# Patient Record
Sex: MALE | Race: BLACK OR AFRICAN AMERICAN | Employment: UNEMPLOYED | URBAN - METROPOLITAN AREA
[De-identification: names, ages, dates, MRNs, and addresses within clinical notes are randomized per-mention and may not be internally consistent; named-entity substitution may affect disease eponyms.]

---

## 2017-02-10 ENCOUNTER — OP HISTORICAL/CONVERTED ENCOUNTER (OUTPATIENT)
Dept: OTHER | Age: 47
End: 2017-02-10

## 2017-12-11 ENCOUNTER — ED HISTORICAL/CONVERTED ENCOUNTER (OUTPATIENT)
Dept: OTHER | Age: 47
End: 2017-12-11

## 2019-08-29 ENCOUNTER — OP HISTORICAL/CONVERTED ENCOUNTER (OUTPATIENT)
Dept: OTHER | Age: 49
End: 2019-08-29

## 2020-06-09 ENCOUNTER — ED HISTORICAL/CONVERTED ENCOUNTER (OUTPATIENT)
Dept: OTHER | Age: 50
End: 2020-06-09

## 2021-07-29 ENCOUNTER — APPOINTMENT (OUTPATIENT)
Dept: GENERAL RADIOLOGY | Age: 51
End: 2021-07-29
Attending: PHYSICIAN ASSISTANT
Payer: OTHER GOVERNMENT

## 2021-07-29 ENCOUNTER — HOSPITAL ENCOUNTER (EMERGENCY)
Age: 51
Discharge: HOME | End: 2021-07-29
Admitting: EMERGENCY MEDICINE
Payer: OTHER GOVERNMENT

## 2021-07-29 VITALS
OXYGEN SATURATION: 95 % | HEIGHT: 74 IN | HEART RATE: 67 BPM | RESPIRATION RATE: 20 BRPM | WEIGHT: 277 LBS | TEMPERATURE: 99.6 F | BODY MASS INDEX: 35.55 KG/M2 | SYSTOLIC BLOOD PRESSURE: 154 MMHG | DIASTOLIC BLOOD PRESSURE: 104 MMHG

## 2021-07-29 DIAGNOSIS — U07.1 COVID-19: Primary | ICD-10-CM

## 2021-07-29 DIAGNOSIS — K29.90 GASTRITIS AND DUODENITIS: ICD-10-CM

## 2021-07-29 DIAGNOSIS — R50.9 FEVER, UNSPECIFIED FEVER CAUSE: ICD-10-CM

## 2021-07-29 LAB
ALBUMIN SERPL-MCNC: 3.6 G/DL (ref 3.5–5)
ALBUMIN/GLOB SERPL: 0.9 {RATIO} (ref 1.1–2.2)
ALP SERPL-CCNC: 46 U/L (ref 45–117)
ALT SERPL-CCNC: 33 U/L (ref 12–78)
ANION GAP SERPL CALC-SCNC: 6 MMOL/L (ref 5–15)
AST SERPL W P-5'-P-CCNC: 43 U/L (ref 15–37)
BASOPHILS # BLD: 0 K/UL (ref 0–0.1)
BASOPHILS NFR BLD: 0 % (ref 0–1)
BILIRUB SERPL-MCNC: 1 MG/DL (ref 0.2–1)
BUN SERPL-MCNC: 17 MG/DL (ref 6–20)
BUN/CREAT SERPL: 13 (ref 12–20)
CA-I BLD-MCNC: 8.5 MG/DL (ref 8.5–10.1)
CHLORIDE SERPL-SCNC: 100 MMOL/L (ref 97–108)
CO2 SERPL-SCNC: 24 MMOL/L (ref 21–32)
CREAT SERPL-MCNC: 1.33 MG/DL (ref 0.7–1.3)
DIFFERENTIAL METHOD BLD: ABNORMAL
EOSINOPHIL # BLD: 0 K/UL (ref 0–0.4)
EOSINOPHIL NFR BLD: 0 % (ref 0–7)
ERYTHROCYTE [DISTWIDTH] IN BLOOD BY AUTOMATED COUNT: 13.2 % (ref 11.5–14.5)
GLOBULIN SER CALC-MCNC: 4.2 G/DL (ref 2–4)
GLUCOSE SERPL-MCNC: 124 MG/DL (ref 65–100)
HCT VFR BLD AUTO: 43.9 % (ref 36.6–50.3)
HGB BLD-MCNC: 16.2 G/DL (ref 12.1–17)
IMM GRANULOCYTES # BLD AUTO: 0 K/UL (ref 0–0.04)
IMM GRANULOCYTES NFR BLD AUTO: 0 % (ref 0–0.5)
LYMPHOCYTES # BLD: 0.9 K/UL (ref 0.8–3.5)
LYMPHOCYTES NFR BLD: 18 % (ref 12–49)
MCH RBC QN AUTO: 29.2 PG (ref 26–34)
MCHC RBC AUTO-ENTMCNC: 36.9 G/DL (ref 30–36.5)
MCV RBC AUTO: 79.1 FL (ref 80–99)
MONOCYTES # BLD: 0.4 K/UL (ref 0–1)
MONOCYTES NFR BLD: 7 % (ref 5–13)
NEUTS SEG # BLD: 3.9 K/UL (ref 1.8–8)
NEUTS SEG NFR BLD: 75 % (ref 32–75)
NRBC # BLD: 0 K/UL (ref 0–0.01)
NRBC BLD-RTO: 0 PER 100 WBC
PLATELET # BLD AUTO: 119 K/UL (ref 150–400)
PMV BLD AUTO: 10.4 FL (ref 8.9–12.9)
POTASSIUM SERPL-SCNC: 3.6 MMOL/L (ref 3.5–5.1)
PROT SERPL-MCNC: 7.8 G/DL (ref 6.4–8.2)
RBC # BLD AUTO: 5.55 M/UL (ref 4.1–5.7)
SODIUM SERPL-SCNC: 130 MMOL/L (ref 136–145)
WBC # BLD AUTO: 5.2 K/UL (ref 4.1–11.1)

## 2021-07-29 PROCEDURE — 74011250636 HC RX REV CODE- 250/636: Performed by: PHYSICIAN ASSISTANT

## 2021-07-29 PROCEDURE — 96374 THER/PROPH/DIAG INJ IV PUSH: CPT

## 2021-07-29 PROCEDURE — 74011250637 HC RX REV CODE- 250/637: Performed by: PHYSICIAN ASSISTANT

## 2021-07-29 PROCEDURE — 99284 EMERGENCY DEPT VISIT MOD MDM: CPT

## 2021-07-29 PROCEDURE — 80053 COMPREHEN METABOLIC PANEL: CPT

## 2021-07-29 PROCEDURE — 36415 COLL VENOUS BLD VENIPUNCTURE: CPT

## 2021-07-29 PROCEDURE — 85025 COMPLETE CBC W/AUTO DIFF WBC: CPT

## 2021-07-29 PROCEDURE — 71045 X-RAY EXAM CHEST 1 VIEW: CPT

## 2021-07-29 PROCEDURE — 74011636637 HC RX REV CODE- 636/637: Performed by: PHYSICIAN ASSISTANT

## 2021-07-29 RX ORDER — IBUPROFEN 600 MG/1
600 TABLET ORAL
Status: COMPLETED | OUTPATIENT
Start: 2021-07-29 | End: 2021-07-29

## 2021-07-29 RX ORDER — MAG HYDROX/ALUMINUM HYD/SIMETH 200-200-20
30 SUSPENSION, ORAL (FINAL DOSE FORM) ORAL ONCE
Status: COMPLETED | OUTPATIENT
Start: 2021-07-29 | End: 2021-07-29

## 2021-07-29 RX ORDER — ALBUTEROL SULFATE 90 UG/1
1 AEROSOL, METERED RESPIRATORY (INHALATION)
Qty: 1 INHALER | Refills: 0 | Status: SHIPPED | OUTPATIENT
Start: 2021-07-29

## 2021-07-29 RX ORDER — ONDANSETRON 2 MG/ML
4 INJECTION INTRAMUSCULAR; INTRAVENOUS
Status: COMPLETED | OUTPATIENT
Start: 2021-07-29 | End: 2021-07-29

## 2021-07-29 RX ORDER — METHYLPREDNISOLONE 4 MG/1
TABLET ORAL
Qty: 1 DOSE PACK | Refills: 0 | Status: SHIPPED | OUTPATIENT
Start: 2021-07-29 | End: 2021-08-07

## 2021-07-29 RX ORDER — PREDNISONE 20 MG/1
20 TABLET ORAL
Status: COMPLETED | OUTPATIENT
Start: 2021-07-29 | End: 2021-07-29

## 2021-07-29 RX ADMIN — ALUMINUM HYDROXIDE, MAGNESIUM HYDROXIDE, AND SIMETHICONE 30 ML: 200; 200; 20 SUSPENSION ORAL at 19:26

## 2021-07-29 RX ADMIN — ONDANSETRON 4 MG: 2 INJECTION INTRAMUSCULAR; INTRAVENOUS at 19:26

## 2021-07-29 RX ADMIN — ACETAMINOPHEN 1000 MG: 650 SOLUTION ORAL at 19:26

## 2021-07-29 RX ADMIN — IBUPROFEN 600 MG: 600 TABLET, FILM COATED ORAL at 19:26

## 2021-07-29 RX ADMIN — PREDNISONE 20 MG: 20 TABLET ORAL at 21:19

## 2021-07-29 RX ADMIN — SODIUM CHLORIDE 1000 ML: 9 INJECTION, SOLUTION INTRAVENOUS at 19:26

## 2021-07-29 NOTE — ED PROVIDER NOTES
EMERGENCY DEPARTMENT HISTORY AND PHYSICAL EXAM      Date: 7/29/2021  Patient Name: Gamaliel Torres    History of Presenting Illness     Chief Complaint   Patient presents with    Positive For Covid-19       History Provided By: Patient    HPI: Gamaliel Torres, 48 y.o. male with a past medical history significant for polycystic kidney disease and hypertension who presents to the ED with cc of persistent fever and generalized weakness/fatigue x3 days after being diagnosed with COVID-19 on 7/26/2021. Associated symptoms include nausea. States that he was also recently diagnosed with sinusitis and is currently taking Augmentin. He is taking Tylenol every 6 hours and states that he is beginning to have some nausea from the medication. Patient denies fever, chills, chest pain, shortness of breath, vomiting, diarrhea    There are no other complaints, changes, or physical findings at this time. PCP: No primary care provider on file. No current facility-administered medications on file prior to encounter. No current outpatient medications on file prior to encounter. Past History     Past Medical History:  Past Medical History:   Diagnosis Date    Hypertension     Polycystic kidney disease        Past Surgical History:  History reviewed. No pertinent surgical history. Family History:  History reviewed. No pertinent family history. Social History:  Social History     Tobacco Use    Smoking status: Never Smoker   Substance Use Topics    Alcohol use: Not on file    Drug use: Not on file       Allergies:  No Known Allergies      Review of Systems     Review of Systems   Constitutional: Positive for fatigue and fever. Negative for chills. HENT: Negative. Respiratory: Negative for cough, chest tightness, shortness of breath and wheezing. Cardiovascular: Negative for chest pain and palpitations. Gastrointestinal: Positive for nausea. Negative for abdominal pain, diarrhea and vomiting. Genitourinary: Negative for frequency and urgency. Musculoskeletal: Negative for back pain, neck pain and neck stiffness. Skin: Negative for rash. Neurological: Negative for dizziness, weakness, light-headedness and headaches. Psychiatric/Behavioral: Negative. All other systems reviewed and are negative. Physical Exam     Physical Exam  Vitals and nursing note reviewed. Constitutional:       General: He is not in acute distress. Appearance: Normal appearance. He is well-developed. He is not ill-appearing, toxic-appearing or diaphoretic. Comments: Warm to touch   HENT:      Head: Normocephalic and atraumatic. Nose: Nose normal. No congestion or rhinorrhea. Mouth/Throat:      Mouth: Mucous membranes are moist.      Pharynx: Oropharynx is clear. No oropharyngeal exudate or posterior oropharyngeal erythema. Eyes:      General: No scleral icterus. Conjunctiva/sclera: Conjunctivae normal.      Pupils: Pupils are equal, round, and reactive to light. Cardiovascular:      Rate and Rhythm: Normal rate and regular rhythm. Pulses:           Radial pulses are 2+ on the right side and 2+ on the left side. Dorsalis pedis pulses are 2+ on the right side and 2+ on the left side. Heart sounds: No murmur heard. No friction rub. No gallop. Pulmonary:      Effort: Pulmonary effort is normal. No tachypnea, accessory muscle usage, respiratory distress or retractions. Breath sounds: Normal breath sounds. No stridor. No decreased breath sounds, wheezing, rhonchi or rales. Chest:      Chest wall: No tenderness. Abdominal:      General: Bowel sounds are normal. There is no distension. Palpations: Abdomen is soft. There is no mass. Tenderness: There is no abdominal tenderness. There is no right CVA tenderness, left CVA tenderness, guarding or rebound. Musculoskeletal:         General: No deformity. Normal range of motion.       Cervical back: Normal range of motion and neck supple. No rigidity. No muscular tenderness. Right lower leg: No edema. Left lower leg: No edema. Skin:     General: Skin is warm and dry. Capillary Refill: Capillary refill takes less than 2 seconds. Coloration: Skin is not jaundiced or pale. Findings: No bruising, erythema or rash. Neurological:      General: No focal deficit present. Mental Status: He is alert and oriented to person, place, and time. Mental status is at baseline. Sensory: Sensation is intact. Motor: Motor function is intact. Psychiatric:         Mood and Affect: Mood normal.         Behavior: Behavior normal. Behavior is cooperative. Thought Content: Thought content normal.         Judgment: Judgment normal.         Lab and Diagnostic Study Results     Labs -     Recent Results (from the past 12 hour(s))   CBC WITH AUTOMATED DIFF    Collection Time: 07/29/21  7:12 PM   Result Value Ref Range    WBC 5.2 4.1 - 11.1 K/uL    RBC 5.55 4.10 - 5.70 M/uL    HGB 16.2 12.1 - 17.0 g/dL    HCT 43.9 36.6 - 50.3 %    MCV 79.1 (L) 80.0 - 99.0 FL    MCH 29.2 26.0 - 34.0 PG    MCHC 36.9 (H) 30.0 - 36.5 g/dL    RDW 13.2 11.5 - 14.5 %    PLATELET 410 (L) 414 - 400 K/uL    MPV 10.4 8.9 - 12.9 FL    NRBC 0.0 0.0  WBC    ABSOLUTE NRBC 0.00 0.00 - 0.01 K/uL    NEUTROPHILS 75 32 - 75 %    LYMPHOCYTES 18 12 - 49 %    MONOCYTES 7 5 - 13 %    EOSINOPHILS 0 0 - 7 %    BASOPHILS 0 0 - 1 %    IMMATURE GRANULOCYTES 0 0 - 0.5 %    ABS. NEUTROPHILS 3.9 1.8 - 8.0 K/UL    ABS. LYMPHOCYTES 0.9 0.8 - 3.5 K/UL    ABS. MONOCYTES 0.4 0.0 - 1.0 K/UL    ABS. EOSINOPHILS 0.0 0.0 - 0.4 K/UL    ABS. BASOPHILS 0.0 0.0 - 0.1 K/UL    ABS. IMM.  GRANS. 0.0 0.00 - 0.04 K/UL    DF AUTOMATED     METABOLIC PANEL, COMPREHENSIVE    Collection Time: 07/29/21  7:12 PM   Result Value Ref Range    Sodium 130 (L) 136 - 145 mmol/L    Potassium 3.6 3.5 - 5.1 mmol/L    Chloride 100 97 - 108 mmol/L    CO2 24 21 - 32 mmol/L    Anion gap 6 5 - 15 mmol/L    Glucose 124 (H) 65 - 100 mg/dL    BUN 17 6 - 20 mg/dL    Creatinine 1.33 (H) 0.70 - 1.30 mg/dL    BUN/Creatinine ratio 13 12 - 20      GFR est AA >60 >60 ml/min/1.73m2    GFR est non-AA 57 (L) >60 ml/min/1.73m2    Calcium 8.5 8.5 - 10.1 mg/dL    Bilirubin, total 1.0 0.2 - 1.0 mg/dL    AST (SGOT) 43 (H) 15 - 37 U/L    ALT (SGPT) 33 12 - 78 U/L    Alk. phosphatase 46 45 - 117 U/L    Protein, total 7.8 6.4 - 8.2 g/dL    Albumin 3.6 3.5 - 5.0 g/dL    Globulin 4.2 (H) 2.0 - 4.0 g/dL    A-G Ratio 0.9 (L) 1.1 - 2.2         Radiologic Studies -   CXR Results  (Last 48 hours)               07/29/21 1918  XR CHEST PORT Final result    Impression:  Patchy bilateral airspace opacities, nonspecific, but would be compatible with   provided clinical history of viral pneumonia. Narrative:  Examination: XR CHEST PORT        History: weakness, +covid       Comparison: None available. FINDINGS:       Single frontal portable view of the chest. Patchy bilateral airspace opacities. No pneumothorax or significant pleural effusion is evident. Cardiac silhouette   is upper limits of normal for size, likely accentuated by low lung volumes and   portable technique. No findings of acute mediastinal abnormality. No acute or   aggressive osseous abnormalities are evident. Medical Decision Making   - I am the first provider for this patient. - I reviewed the vital signs, available nursing notes, past medical history, past surgical history, family history and social history. - Initial assessment performed. The patients presenting problems have been discussed, and they are in agreement with the care plan formulated and outlined with them. I have encouraged them to ask questions as they arise throughout their visit. Vital Signs-Reviewed the patient's vital signs.   Patient Vitals for the past 24 hrs:   Temp Pulse Resp BP SpO2   07/29/21 2138 99.6 °F (37.6 °C) 67 20 (!) 154/104 95 % 07/29/21 2019 (!) 101 °F (38.3 °C) 69 20 (!) 146/86 95 %   07/29/21 1846 (!) 103 °F (39.4 °C) 76 18 (!) 160/98 97 %       Records Reviewed: Nursing Notes and Old Medical Records    The patient presents with fever with a differential diagnosis of covid-19, pneumonia, gastritis, medication side effect      ED Course:          Provider Notes (Medical Decision Making):     MDM  Number of Diagnoses or Management Options  COVID-19  Fever, unspecified fever cause  Gastritis and duodenitis  Diagnosis management comments:     45-year-old male with positive COVID-19 diagnosis with persistent fever and fatigue. CBC within normal is without leukocytosis. CMP with elevated creatinine-patient does have history of polycystic kidney disease. Chest x-ray patchy bilateral opacities. Patient's fever improved with IV fluids, Tylenol, ibuprofen. Given abnormal chest x-ray finding-will start on steroids and give albuterol inhaler to use as needed although patient is not complaining of shortness of breath. SPO2 is 95-96% on room air. No hypoxia with ambulation. No increased work of breathing or tachypnea. Patient already on Augmentin for sinus infection-has a few days left. Discussed with patient this is likely what is causing his GI upset and recommended taking Augmentin with food. Patient nontoxic appearing, stable VS, tolerating PO. Reviewed care plan with patient. Recommend follow up with PCP within 24-48 hours. Return precautions to ED discussed if symptoms worsen. Patient agreeable with plan of care. Amount and/or Complexity of Data Reviewed  Clinical lab tests: ordered and reviewed  Tests in the radiology section of CPT®: ordered and reviewed  Review and summarize past medical records: yes    Patient Progress  Patient progress: stable           Disposition   Disposition: DC- Adult Discharges: All of the diagnostic tests were reviewed and questions answered.  Diagnosis, care plan and treatment options were discussed. The patient understands the instructions and will follow up as directed. The patients results have been reviewed with them. They have been counseled regarding their diagnosis. The patient verbally convey understanding and agreement of the signs, symptoms, diagnosis, treatment and prognosis and additionally agrees to follow up as recommended with their PCP in 24 - 48 hours. They also agree with the care-plan and convey that all of their questions have been answered. I have also put together some discharge instructions for them that include: 1) educational information regarding their diagnosis, 2) how to care for their diagnosis at home, as well a 3) list of reasons why they would want to return to the ED prior to their follow-up appointment, should their condition change. DISCHARGE PLAN:  1. There are no discharge medications for this patient. 2.   Follow-up Information     Follow up With Specialties Details Why Contact Info    Primary Care Provider  In 2 days As needed     [facility] EMERGENCY DEPT Emergency Medicine  As needed, If symptoms worsen [address] [facility] [address] [phone]        3. Return to ED if worse   4. Current Discharge Medication List      START taking these medications    Details   methylPREDNISolone (Medrol, Ramses,) 4 mg tablet See instructions  Qty: 1 Dose Pack, Refills: 0  Start date: 7/29/2021      albuterol (ProAir HFA) 90 mcg/actuation inhaler Take 1 Puff by inhalation every four (4) hours as needed for Wheezing. Qty: 1 Inhaler, Refills: 0  Start date: 7/29/2021               Diagnosis     Clinical Impression:   1. COVID-19    2. Fever, unspecified fever cause    3. Gastritis and duodenitis        Attestations:    TORIBIO Sanchez    Please note that this dictation was completed with [employer], the [employer] voice recognition software.   Quite often unanticipated grammatical, syntax, homophones, and other interpretive errors are inadvertently transcribed by the computer software. Please disregard these errors. Please excuse any errors that have escaped final proofreading. Thank you.

## 2021-07-30 ENCOUNTER — PATIENT OUTREACH (OUTPATIENT)
Dept: CASE MANAGEMENT | Age: 51
End: 2021-07-30

## 2021-08-03 ENCOUNTER — HOSPITAL ENCOUNTER (INPATIENT)
Age: 51
LOS: 4 days | Discharge: HOME | DRG: 137 | End: 2021-08-07
Attending: STUDENT IN AN ORGANIZED HEALTH CARE EDUCATION/TRAINING PROGRAM | Admitting: INTERNAL MEDICINE
Payer: MEDICAID

## 2021-08-03 ENCOUNTER — APPOINTMENT (OUTPATIENT)
Dept: GENERAL RADIOLOGY | Age: 51
DRG: 137 | End: 2021-08-03
Attending: STUDENT IN AN ORGANIZED HEALTH CARE EDUCATION/TRAINING PROGRAM
Payer: MEDICAID

## 2021-08-03 ENCOUNTER — PATIENT OUTREACH (OUTPATIENT)
Dept: CASE MANAGEMENT | Age: 51
End: 2021-08-03

## 2021-08-03 DIAGNOSIS — U07.1 PNEUMONIA DUE TO COVID-19 VIRUS: Primary | ICD-10-CM

## 2021-08-03 DIAGNOSIS — R09.02 HYPOXIA: ICD-10-CM

## 2021-08-03 DIAGNOSIS — J12.82 PNEUMONIA DUE TO COVID-19 VIRUS: Primary | ICD-10-CM

## 2021-08-03 PROBLEM — I10 ESSENTIAL HYPERTENSION: Status: ACTIVE | Noted: 2021-08-03

## 2021-08-03 PROBLEM — J96.01 ACUTE RESPIRATORY FAILURE WITH HYPOXIA (HCC): Status: ACTIVE | Noted: 2021-08-03

## 2021-08-03 PROBLEM — Q61.3 POLYCYSTIC KIDNEY DISEASE: Status: ACTIVE | Noted: 2021-08-03

## 2021-08-03 LAB
ALBUMIN SERPL-MCNC: 3.4 G/DL (ref 3.5–5)
ALBUMIN/GLOB SERPL: 0.7 {RATIO} (ref 1.1–2.2)
ALP SERPL-CCNC: 70 U/L (ref 45–117)
ALT SERPL-CCNC: 52 U/L (ref 12–78)
ANION GAP SERPL CALC-SCNC: 8 MMOL/L (ref 5–15)
AST SERPL W P-5'-P-CCNC: 36 U/L (ref 15–37)
ATRIAL RATE: 74 BPM
BASOPHILS # BLD: 0 K/UL (ref 0–0.1)
BASOPHILS NFR BLD: 0 % (ref 0–1)
BILIRUB SERPL-MCNC: 1.6 MG/DL (ref 0.2–1)
BNP SERPL-MCNC: 27 PG/ML
BUN SERPL-MCNC: 24 MG/DL (ref 6–20)
BUN/CREAT SERPL: 18 (ref 12–20)
CA-I BLD-MCNC: 9.4 MG/DL (ref 8.5–10.1)
CALCULATED P AXIS, ECG09: 38 DEGREES
CALCULATED R AXIS, ECG10: -7 DEGREES
CALCULATED T AXIS, ECG11: 23 DEGREES
CHLORIDE SERPL-SCNC: 102 MMOL/L (ref 97–108)
CO2 SERPL-SCNC: 26 MMOL/L (ref 21–32)
CREAT SERPL-MCNC: 1.35 MG/DL (ref 0.7–1.3)
CRP SERPL-MCNC: 10.8 MG/DL (ref 0–0.6)
D DIMER PPP FEU-MCNC: 0.51 UG/ML(FEU)
DIAGNOSIS, 93000: NORMAL
DIFFERENTIAL METHOD BLD: ABNORMAL
EOSINOPHIL # BLD: 0 K/UL (ref 0–0.4)
EOSINOPHIL NFR BLD: 0 % (ref 0–7)
ERYTHROCYTE [DISTWIDTH] IN BLOOD BY AUTOMATED COUNT: 13.2 % (ref 11.5–14.5)
GLOBULIN SER CALC-MCNC: 4.6 G/DL (ref 2–4)
GLUCOSE SERPL-MCNC: 102 MG/DL (ref 65–100)
HCT VFR BLD AUTO: 45.8 % (ref 36.6–50.3)
HGB BLD-MCNC: 16.8 G/DL (ref 12.1–17)
IMM GRANULOCYTES # BLD AUTO: 0 K/UL
IMM GRANULOCYTES NFR BLD AUTO: 0 %
LACTATE SERPL-SCNC: 2 MMOL/L (ref 0.4–2)
LYMPHOCYTES # BLD: 1.9 K/UL (ref 0.8–3.5)
LYMPHOCYTES NFR BLD: 14 % (ref 12–49)
MCH RBC QN AUTO: 29 PG (ref 26–34)
MCHC RBC AUTO-ENTMCNC: 36.7 G/DL (ref 30–36.5)
MCV RBC AUTO: 79 FL (ref 80–99)
MONOCYTES # BLD: 0.4 K/UL (ref 0–1)
MONOCYTES NFR BLD: 3 % (ref 5–13)
MYELOCYTES NFR BLD MANUAL: 2 %
NEUTS BAND NFR BLD MANUAL: 1 % (ref 0–6)
NEUTS SEG # BLD: 10.1 K/UL (ref 1.8–8)
NEUTS SEG NFR BLD: 73 % (ref 32–75)
NRBC # BLD: 0 K/UL (ref 0–0.01)
NRBC BLD-RTO: 0 PER 100 WBC
OTHER CELLS NFR BLD MANUAL: 7 %
P-R INTERVAL, ECG05: 152 MS
PLATELET # BLD AUTO: 309 K/UL (ref 150–400)
PMV BLD AUTO: 10.3 FL (ref 8.9–12.9)
POTASSIUM SERPL-SCNC: 4.2 MMOL/L (ref 3.5–5.1)
PROCALCITONIN SERPL-MCNC: <0.05 NG/ML
PROT SERPL-MCNC: 8 G/DL (ref 6.4–8.2)
Q-T INTERVAL, ECG07: 410 MS
QRS DURATION, ECG06: 86 MS
QTC CALCULATION (BEZET), ECG08: 455 MS
RBC # BLD AUTO: 5.8 M/UL (ref 4.1–5.7)
RBC MORPH BLD: ABNORMAL
SODIUM SERPL-SCNC: 136 MMOL/L (ref 136–145)
TROPONIN I SERPL-MCNC: <0.05 NG/ML
VENTRICULAR RATE, ECG03: 74 BPM
WBC # BLD AUTO: 13.6 K/UL (ref 4.1–11.1)

## 2021-08-03 PROCEDURE — 93005 ELECTROCARDIOGRAM TRACING: CPT

## 2021-08-03 PROCEDURE — 84145 PROCALCITONIN (PCT): CPT

## 2021-08-03 PROCEDURE — 74011250636 HC RX REV CODE- 250/636: Performed by: INTERNAL MEDICINE

## 2021-08-03 PROCEDURE — 99223 1ST HOSP IP/OBS HIGH 75: CPT | Performed by: INTERNAL MEDICINE

## 2021-08-03 PROCEDURE — 74011250637 HC RX REV CODE- 250/637: Performed by: INTERNAL MEDICINE

## 2021-08-03 PROCEDURE — 85379 FIBRIN DEGRADATION QUANT: CPT

## 2021-08-03 PROCEDURE — 86140 C-REACTIVE PROTEIN: CPT

## 2021-08-03 PROCEDURE — 85025 COMPLETE CBC W/AUTO DIFF WBC: CPT

## 2021-08-03 PROCEDURE — 83880 ASSAY OF NATRIURETIC PEPTIDE: CPT

## 2021-08-03 PROCEDURE — 65270000029 HC RM PRIVATE

## 2021-08-03 PROCEDURE — XW033H5 INTRODUCTION OF TOCILIZUMAB INTO PERIPHERAL VEIN, PERCUTANEOUS APPROACH, NEW TECHNOLOGY GROUP 5: ICD-10-PCS | Performed by: INTERNAL MEDICINE

## 2021-08-03 PROCEDURE — 87040 BLOOD CULTURE FOR BACTERIA: CPT

## 2021-08-03 PROCEDURE — 71045 X-RAY EXAM CHEST 1 VIEW: CPT

## 2021-08-03 PROCEDURE — 84484 ASSAY OF TROPONIN QUANT: CPT

## 2021-08-03 PROCEDURE — 36415 COLL VENOUS BLD VENIPUNCTURE: CPT

## 2021-08-03 PROCEDURE — 83605 ASSAY OF LACTIC ACID: CPT

## 2021-08-03 PROCEDURE — 74011000258 HC RX REV CODE- 258: Performed by: INTERNAL MEDICINE

## 2021-08-03 PROCEDURE — 80053 COMPREHEN METABOLIC PANEL: CPT

## 2021-08-03 PROCEDURE — 94760 N-INVAS EAR/PLS OXIMETRY 1: CPT

## 2021-08-03 PROCEDURE — 99285 EMERGENCY DEPT VISIT HI MDM: CPT

## 2021-08-03 RX ORDER — FAMOTIDINE 20 MG/1
20 TABLET, FILM COATED ORAL 2 TIMES DAILY
Status: DISCONTINUED | OUTPATIENT
Start: 2021-08-03 | End: 2021-08-07 | Stop reason: HOSPADM

## 2021-08-03 RX ORDER — ONDANSETRON 4 MG/1
4 TABLET, ORALLY DISINTEGRATING ORAL
Status: DISCONTINUED | OUTPATIENT
Start: 2021-08-03 | End: 2021-08-07 | Stop reason: HOSPADM

## 2021-08-03 RX ORDER — ZINC SULFATE 50(220)MG
1 CAPSULE ORAL DAILY
Status: DISCONTINUED | OUTPATIENT
Start: 2021-08-04 | End: 2021-08-07 | Stop reason: HOSPADM

## 2021-08-03 RX ORDER — AMLODIPINE BESYLATE 5 MG/1
10 TABLET ORAL DAILY
Status: DISCONTINUED | OUTPATIENT
Start: 2021-08-04 | End: 2021-08-07 | Stop reason: HOSPADM

## 2021-08-03 RX ORDER — SODIUM CHLORIDE 0.9 % (FLUSH) 0.9 %
5-40 SYRINGE (ML) INJECTION AS NEEDED
Status: DISCONTINUED | OUTPATIENT
Start: 2021-08-03 | End: 2021-08-07 | Stop reason: HOSPADM

## 2021-08-03 RX ORDER — MELATONIN
2000 DAILY
Status: DISCONTINUED | OUTPATIENT
Start: 2021-08-03 | End: 2021-08-05

## 2021-08-03 RX ORDER — ONDANSETRON 2 MG/ML
4 INJECTION INTRAMUSCULAR; INTRAVENOUS
Status: DISCONTINUED | OUTPATIENT
Start: 2021-08-03 | End: 2021-08-07 | Stop reason: HOSPADM

## 2021-08-03 RX ORDER — ACETAMINOPHEN 650 MG/1
650 SUPPOSITORY RECTAL
Status: DISCONTINUED | OUTPATIENT
Start: 2021-08-03 | End: 2021-08-07 | Stop reason: HOSPADM

## 2021-08-03 RX ORDER — SODIUM CHLORIDE 0.9 % (FLUSH) 0.9 %
5-40 SYRINGE (ML) INJECTION EVERY 8 HOURS
Status: DISCONTINUED | OUTPATIENT
Start: 2021-08-03 | End: 2021-08-07 | Stop reason: HOSPADM

## 2021-08-03 RX ORDER — DEXAMETHASONE 4 MG/1
6 TABLET ORAL EVERY 8 HOURS
Status: DISCONTINUED | OUTPATIENT
Start: 2021-08-03 | End: 2021-08-07 | Stop reason: HOSPADM

## 2021-08-03 RX ORDER — HYDRALAZINE HYDROCHLORIDE 20 MG/ML
10 INJECTION INTRAMUSCULAR; INTRAVENOUS
Status: DISCONTINUED | OUTPATIENT
Start: 2021-08-03 | End: 2021-08-07 | Stop reason: HOSPADM

## 2021-08-03 RX ORDER — ASCORBIC ACID 500 MG
1000 TABLET ORAL 2 TIMES DAILY
Status: DISCONTINUED | OUTPATIENT
Start: 2021-08-03 | End: 2021-08-07 | Stop reason: HOSPADM

## 2021-08-03 RX ORDER — ALBUTEROL SULFATE 90 UG/1
2 AEROSOL, METERED RESPIRATORY (INHALATION)
Status: DISCONTINUED | OUTPATIENT
Start: 2021-08-03 | End: 2021-08-07 | Stop reason: HOSPADM

## 2021-08-03 RX ORDER — ENOXAPARIN SODIUM 100 MG/ML
30 INJECTION SUBCUTANEOUS EVERY 12 HOURS
Status: DISCONTINUED | OUTPATIENT
Start: 2021-08-03 | End: 2021-08-07 | Stop reason: HOSPADM

## 2021-08-03 RX ORDER — HYDROCODONE POLISTIREX AND CHLORPHENIRAMINE POLISTIREX 10; 8 MG/5ML; MG/5ML
5 SUSPENSION, EXTENDED RELEASE ORAL
Status: DISCONTINUED | OUTPATIENT
Start: 2021-08-03 | End: 2021-08-07 | Stop reason: HOSPADM

## 2021-08-03 RX ORDER — ACETAMINOPHEN 325 MG/1
650 TABLET ORAL
Status: DISCONTINUED | OUTPATIENT
Start: 2021-08-03 | End: 2021-08-07 | Stop reason: HOSPADM

## 2021-08-03 RX ORDER — POLYETHYLENE GLYCOL 3350 17 G/17G
17 POWDER, FOR SOLUTION ORAL DAILY PRN
Status: DISCONTINUED | OUTPATIENT
Start: 2021-08-03 | End: 2021-08-07 | Stop reason: HOSPADM

## 2021-08-03 RX ADMIN — FAMOTIDINE 20 MG: 20 TABLET ORAL at 20:19

## 2021-08-03 RX ADMIN — Medication 10 ML: at 14:26

## 2021-08-03 RX ADMIN — OXYCODONE HYDROCHLORIDE AND ACETAMINOPHEN 1000 MG: 500 TABLET ORAL at 20:19

## 2021-08-03 RX ADMIN — Medication 2000 UNITS: at 09:36

## 2021-08-03 RX ADMIN — AZITHROMYCIN DIHYDRATE 500 MG: 500 INJECTION, POWDER, LYOPHILIZED, FOR SOLUTION INTRAVENOUS at 09:36

## 2021-08-03 RX ADMIN — TOCILIZUMAB 800 MG: 20 INJECTION, SOLUTION, CONCENTRATE INTRAVENOUS at 17:32

## 2021-08-03 RX ADMIN — Medication 10 ML: at 20:40

## 2021-08-03 RX ADMIN — ENOXAPARIN SODIUM 30 MG: 100 INJECTION SUBCUTANEOUS at 20:19

## 2021-08-03 RX ADMIN — DEXAMETHASONE 6 MG: 4 TABLET ORAL at 20:19

## 2021-08-03 RX ADMIN — DEXAMETHASONE 6 MG: 4 TABLET ORAL at 09:35

## 2021-08-03 RX ADMIN — DEXAMETHASONE 6 MG: 4 TABLET ORAL at 14:25

## 2021-08-03 RX ADMIN — ENOXAPARIN SODIUM 30 MG: 100 INJECTION SUBCUTANEOUS at 10:32

## 2021-08-03 RX ADMIN — FAMOTIDINE 20 MG: 20 TABLET ORAL at 09:36

## 2021-08-03 NOTE — ED TRIAGE NOTES
Pt reports dx with covid and PNA about 2 weeks ago. States worsening SOB since then. Says has pulse ox at home and that it was reading 89-90%.

## 2021-08-03 NOTE — PROGRESS NOTES
Remdesivir Therapy Evaluation & Monitoring Ivent    Initial Evaluation:    Patient meets remdesivir criteria for use and supply is available at [facility]: NO  Patient must have positive COVID-19 test result and meet criteria for use  Criteria found below:        Patient's symptoms started 2 weeks ago per H&P note, and therefore is not a candidate for remdesivir at this time. However, patient is a candidate for tocilizumab. Spoke with ID physician, and decision was made to not administer remdesivir due to criteria set by [address] [place] [address].

## 2021-08-03 NOTE — ED PROVIDER NOTES
EMERGENCY DEPARTMENT HISTORY AND PHYSICAL EXAM      Date: 8/3/2021  Patient Name: Heavenly Booker    History of Presenting Illness     Chief Complaint   Patient presents with    Shortness of Breath       History Provided By: Patient    HPI: Heavenly Booker, 48 y.o. male with a past medical history significant hypertension presents to the ED with cc of cough, shortness of breath. Patient was diagnosed with Covid 19 approximately 2 weeks ago. [facility], was originally diagnosed with pneumonitis. Patient states that, states that his shortness of breath was worsening, came to emergency department on July 29, at that time vitals are stable chest x-ray showed patchy bilateral infiltrates, consistent with viral pneumonitis. Patient discharged home with prednisone and albuterol. Patient states since that time has noticed worsening cough, worsening shortness of breath, patient has home pulse ox was noted to be upper 80s at home today, came to emergency department for further evaluation. Patient denies any chest pain denies any abdominal pain nausea vomiting. There are no other complaints, changes, or physical findings at this time. PCP: Other, MD Stephen    Current Outpatient Medications   Medication Sig Dispense Refill    methylPREDNISolone (Medrol, Ramses,) 4 mg tablet See instructions 1 Dose Pack 0    albuterol (ProAir HFA) 90 mcg/actuation inhaler Take 1 Puff by inhalation every four (4) hours as needed for Wheezing. 1 Inhaler 0       Past History     Past Medical History:  Past Medical History:   Diagnosis Date    Hypertension     Polycystic kidney disease        Past Surgical History:  History reviewed. No pertinent surgical history. Family History:  History reviewed. No pertinent family history.     Social History:  Social History     Tobacco Use    Smoking status: Former Smoker     Years: 20.00    Smokeless tobacco: Never Used    Tobacco comment: smoked on occasion Substance Use Topics    Alcohol use: Not Currently    Drug use: Not on file       Allergies:  No Known Allergies      Review of Systems     Review of Systems   Constitutional: Positive for fatigue. Negative for activity change, chills and fever. HENT: Positive for congestion and sore throat. Eyes: Negative for photophobia and visual disturbance. Respiratory: Positive for cough, chest tightness and shortness of breath. Cardiovascular: Negative for chest pain and leg swelling. Gastrointestinal: Negative for abdominal pain and vomiting. Genitourinary: Negative for dysuria and frequency. Musculoskeletal: Positive for myalgias. Negative for arthralgias, back pain, neck pain and neck stiffness. Skin: Negative for color change. Neurological: Negative for dizziness, light-headedness and headaches. Physical Exam     Physical Exam  Vitals and nursing note reviewed. Constitutional:       Appearance: Normal appearance. He is normal weight. HENT:      Head: Normocephalic and atraumatic. Nose: Nose normal.      Mouth/Throat:      Mouth: Mucous membranes are moist.   Eyes:      Extraocular Movements: Extraocular movements intact. Pupils: Pupils are equal, round, and reactive to light. Cardiovascular:      Rate and Rhythm: Normal rate and regular rhythm. Pulses: Normal pulses. Heart sounds: Normal heart sounds. Pulmonary:      Effort: Pulmonary effort is normal.      Breath sounds: Normal breath sounds. Abdominal:      General: Abdomen is flat. Bowel sounds are normal.      Palpations: Abdomen is soft. Musculoskeletal:         General: No swelling or tenderness. Normal range of motion. Cervical back: Normal range of motion and neck supple. Right lower leg: No tenderness. No edema. Left lower leg: No tenderness. No edema. Skin:     General: Skin is warm and dry. Capillary Refill: Capillary refill takes less than 2 seconds.    Neurological: General: No focal deficit present. Mental Status: He is alert and oriented to person, place, and time. Cranial Nerves: No cranial nerve deficit. Sensory: No sensory deficit. Motor: No weakness. Psychiatric:         Mood and Affect: Mood normal.         Behavior: Behavior normal.         Diagnostic Study Results     Labs -     Recent Results (from the past 12 hour(s))   CBC WITH AUTOMATED DIFF    Collection Time: 08/03/21  5:55 AM   Result Value Ref Range    WBC 13.6 (H) 4.1 - 11.1 K/uL    RBC 5.80 (H) 4.10 - 5.70 M/uL    HGB 16.8 12.1 - 17.0 g/dL    HCT 45.8 36.6 - 50.3 %    MCV 79.0 (L) 80.0 - 99.0 FL    MCH 29.0 26.0 - 34.0 PG    MCHC 36.7 (H) 30.0 - 36.5 g/dL    RDW 13.2 11.5 - 14.5 %    PLATELET 127 236 - 770 K/uL    MPV 10.3 8.9 - 12.9 FL    NRBC 0.0 0.0  WBC    ABSOLUTE NRBC 0.00 0.00 - 0.01 K/uL    NEUTROPHILS PENDING %    LYMPHOCYTES PENDING %    MONOCYTES PENDING %    EOSINOPHILS PENDING %    BASOPHILS PENDING %    IMMATURE GRANULOCYTES PENDING %    ABS. NEUTROPHILS PENDING K/UL    ABS. LYMPHOCYTES PENDING K/UL    ABS. MONOCYTES PENDING K/UL    ABS. EOSINOPHILS PENDING K/UL    ABS. BASOPHILS PENDING K/UL    ABS. IMM. GRANS. PENDING K/UL    DF PENDING    METABOLIC PANEL, COMPREHENSIVE    Collection Time: 08/03/21  5:55 AM   Result Value Ref Range    Sodium 136 136 - 145 mmol/L    Potassium 4.2 3.5 - 5.1 mmol/L    Chloride 102 97 - 108 mmol/L    CO2 26 21 - 32 mmol/L    Anion gap 8 5 - 15 mmol/L    Glucose 102 (H) 65 - 100 mg/dL    BUN 24 (H) 6 - 20 mg/dL    Creatinine 1.35 (H) 0.70 - 1.30 mg/dL    BUN/Creatinine ratio 18 12 - 20      GFR est AA >60 >60 ml/min/1.73m2    GFR est non-AA 56 (L) >60 ml/min/1.73m2    Calcium 9.4 8.5 - 10.1 mg/dL    Bilirubin, total 1.6 (H) 0.2 - 1.0 mg/dL    AST (SGOT) 36 15 - 37 U/L    ALT (SGPT) 52 12 - 78 U/L    Alk.  phosphatase 70 45 - 117 U/L    Protein, total 8.0 6.4 - 8.2 g/dL    Albumin 3.4 (L) 3.5 - 5.0 g/dL    Globulin 4.6 (H) 2.0 - 4.0 g/dL A-G Ratio 0.7 (L) 1.1 - 2.2     TROPONIN I    Collection Time: 08/03/21  5:55 AM   Result Value Ref Range    Troponin-I, Qt. <0.05 <0.05 ng/mL   BNP    Collection Time: 08/03/21  5:55 AM   Result Value Ref Range    NT pro-BNP 27 <125 pg/mL   LACTIC ACID    Collection Time: 08/03/21  5:55 AM   Result Value Ref Range    Lactic acid 2.0 0.4 - 2.0 mmol/L       Radiologic Studies -   [unfilled]  CT Results  (Last 48 hours)    None        CXR Results  (Last 48 hours)    None          Medical Decision Making and ED Course   I am the first provider for this patient. I reviewed the vital signs, available nursing notes, past medical history, past surgical history, family history and social history. Vital Signs-Reviewed the patient's vital signs. Patient Vitals for the past 12 hrs:   Temp Pulse Resp BP SpO2   08/03/21 0543     92 %   08/03/21 0543 98.4 °F (36.9 °C) 82 22 (!) 142/103 (!) 81 %       EKG interpretation: (Preliminary)  Normal sinus rhythm 74, no ST elevation, normal axis. Records Reviewed: Nursing Notes and Old Medical Records    The patient presents with shortness of breath, cough with a differential diagnosis of Covid pneumonia, bacterial superinfection, pulmonary edema, CHF exacerbation, ACS      Provider Notes (Medical Decision Making):     MDM   42-year-old male, past medical history of hypertension, presents to emergency department for cough, worsening shortness of breath over the last 2 days. Of note patient was diagnosed with Covid 19 approximately 2 weeks ago. Patient noted to be tachypneic, hypoxic on presentation, 81% on room air, placed on 4 L nasal cannula with improvement of oxygenation 92% currently. Physical exam shows afebrile male, no distress otherwise, clear breath sounds bilateral.  No lower extremity swelling. We will draw basic lab work, chest x-ray, given hypoxia, will admit patient for continued oxygen support. ED Course:   Initial assessment performed. The patients presenting problems have been discussed, and they are in agreement with the care plan formulated and outlined with them. I have encouraged them to ask questions as they arise throughout their visit. ED Course as of Aug 03 0724   Tue Aug 03, 2021   0719 Patient's lab work resulting, CBC shows mild leukocytosis 62,454, metabolic panel shows mild renal sufficiency BUN 24 creatinine 1.35, BNP 27, troponin negative. [PZ]   4103 I reviewed chest x-ray, continued bilateral patchy infiltrates no signs of acute infiltrate or effusion worsening from prior chest x-ray. [PZ]   0720 Discussed case with Dr. Ana Arrington, will admit patient for COVID pneumonia, hypoxia. [PZ]      ED Course User Index  [PZ] Hayley Randall MD         Procedures       Ana Maria Balderas MD  Procedures               CRITICAL CARE NOTE :  7:13 AM  Amount of Critical Care Time: __30__(minutes)__    IMPENDING DETERIORATION -Respiratory  ASSOCIATED RISK FACTORS - Hypoxia  MANAGEMENT- Bedside Assessment and Supervision of Care  INTERPRETATION -  Xrays and ECG  INTERVENTIONS - supplemental oxygen  CASE REVIEW - Hospitalist/Intensivist  TREATMENT RESPONSE -Improved  PERFORMED BY - Self    NOTES   :  I have spent critical care time involved in lab review, consultations with specialist, family decision- making, bedside attention and documentation. This time excludes time spent in any separate billed procedures. During this entire length of time I was immediately available to the patient . Ana Maria Balderas MD        Disposition       Admitted        Diagnosis     Clinical Impression:   1. Pneumonia due to COVID-19 virus    2. Hypoxia        Attestations:    Ana Maria Balderas MD    Please note that this dictation was completed with [employer], the computer voice recognition software. Quite often unanticipated grammatical, syntax, homophones, and other interpretive errors are inadvertently transcribed by the computer software.   Please disregard these errors. Please excuse any errors that have escaped final proofreading. Thank you.

## 2021-08-03 NOTE — H&P
History & Physical    Primary Care Provider: Clarissa Woodall MD  Source of Information: Patient, records    History of Presenting Illness:   Hawa Wallace is a 48 y.o. male who presents with  Hx hypertension, PKD, ex-smoker having last cig 2 weeks ago. Presents to ED 2/2 sob. He developed shortness of breath cough and fever approximately 2 weeks ago and COVID-19 was confirmed no testing approximately 1 week ago. Shortness of breath and cough have persisted, no fever. His shortness of breath much worse with exertion. He was seen in the emergency room on 7/29/20 and sent home on albuterol inhalers and Medrol Dosepak. He states that he is not gotten any better and in fact his exertional shortness of breath is worse and has noted some hypoxia on his O2/oximeter at home. On presentation here his O2 sat was reported at 81% and has since been put on 6 L per nasal cannula and is saturating above 92%. Chest x-ray is consistent with viral pneumonitis. He had no recent fevers or chills. He has had an incessant cough and since then has developed some right-sided pleuritic chest wall pain. He is referred to us in lieu of his COVID-19 positivity, worsening shortness of breath with exertion, acute hypoxic respiratory failure and will be admitted for further management. Patient not previously vaccinated for Covid. He is now motivated for Vaccine when indicated. Review of Systems:  Constitutional: Positive for fatigue. Negative for activity change, chills and fever. HENT: Positive for congestion and sore throat. Eyes: Negative for photophobia and visual disturbance. Respiratory: Positive for cough, chest tightness/worse with deep inspiration and shortness of breath with exertion. Cardiovascular: Negative for chest pain and leg swelling. Gastrointestinal: Negative for abdominal pain and vomiting. Genitourinary: Negative for dysuria and frequency.    Musculoskeletal: Positive for myalgias. Negative for arthralgias, back pain, neck pain and neck stiffness. Skin: Negative for color change. Neurological: Negative for dizziness, light-headedness and headaches. Past Medical History:   Diagnosis Date    Hypertension     Polycystic kidney disease       History reviewed. No pertinent surgical history. Prior to Admission medications    Medication Sig Start Date End Date Taking? Authorizing Provider   methylPREDNISolone (Medrol, Ramses,) 4 mg tablet See instructions 7/29/21   Kaitlyn Garay PA   albuterol (ProAir HFA) 90 mcg/actuation inhaler Take 1 Puff by inhalation every four (4) hours as needed for Wheezing. 7/29/21   Kaitlyn Garay PA     No Known Allergies   History reviewed. No pertinent family history. SOCIAL HISTORY:  Patient resides:  Independently x   Assisted Living    SNF    With family care       Smoking history: Quit 7/2021   None    Former x   Chronic      Alcohol history:   None    Social x   Chronic      Ambulates:   Independently x   w/cane    w/walker    w/wc    CODE STATUS:  DNR    Full x   Other      Objective:     Physical Exam:     Visit Vitals  BP (!) 142/103   Pulse 82   Temp 98.4 °F (36.9 °C)   Resp 22   Ht 6' 2\" (1.88 m)   Wt 125.6 kg (277 lb)   SpO2 92%   BMI 35.56 kg/m²    O2 Flow Rate (L/min): 6 l/min O2 Device: Nasal cannula    General:  Alert, cooperative, no distress, appears stated age. Head:  Normocephalic, without obvious abnormality, atraumatic. Eyes:  Conjunctivae/corneas clear. PERRL, EOMs intact. Nose: Nares normal. Septum midline. Mucosa normal. No drainage or sinus tenderness. Throat: Lips, mucosa, and tongue normal. Teeth and gums normal.   Neck: Supple, symmetrical, trachea midline, no adenopathy, thyroid: no enlargement/tenderness/nodules, no carotid bruit and no JVD. Back:   Symmetric, no curvature. ROM normal. No CVA tenderness. Lungs:   Clear to auscultation bilaterally.    Chest wall:  No tenderness or deformity. Heart:  Regular rate and rhythm, S1, S2 normal, no murmur, click, rub or gallop. Abdomen:   Soft, non-tender. Bowel sounds normal. No masses,  No organomegaly. Extremities: Extremities normal, atraumatic, no cyanosis or edema. Pulses: 2+ and symmetric all extremities. Skin: Skin color, texture, turgor normal. No rashes or lesions   Neurologic: CNII-XII intact. No motor or sensory deficits. Data Review:     Recent Days:  Recent Labs     08/03/21  0555   WBC 13.6*   HGB 16.8   HCT 45.8        Recent Labs     08/03/21  0555      K 4.2      CO2 26   *   BUN 24*   CREA 1.35*   CA 9.4   ALB 3.4*   TBILI 1.6*   ALT 52     No results for input(s): PH, PCO2, PO2, HCO3, FIO2 in the last 72 hours. 24 Hour Results:  Recent Results (from the past 24 hour(s))   CBC WITH AUTOMATED DIFF    Collection Time: 08/03/21  5:55 AM   Result Value Ref Range    WBC 13.6 (H) 4.1 - 11.1 K/uL    RBC 5.80 (H) 4.10 - 5.70 M/uL    HGB 16.8 12.1 - 17.0 g/dL    HCT 45.8 36.6 - 50.3 %    MCV 79.0 (L) 80.0 - 99.0 FL    MCH 29.0 26.0 - 34.0 PG    MCHC 36.7 (H) 30.0 - 36.5 g/dL    RDW 13.2 11.5 - 14.5 %    PLATELET 741 896 - 598 K/uL    MPV 10.3 8.9 - 12.9 FL    NRBC 0.0 0.0  WBC    ABSOLUTE NRBC 0.00 0.00 - 0.01 K/uL    NEUTROPHILS 73 32 - 75 %    BAND NEUTROPHILS 1 0 - 6 %    LYMPHOCYTES 14 12 - 49 %    MONOCYTES 3 (L) 5 - 13 %    EOSINOPHILS 0 0 - 7 %    BASOPHILS 0 0 - 1 %    MYELOCYTES 2 (H) 0 %    OTHER CELL 7 %    IMMATURE GRANULOCYTES 0 %    ABS. NEUTROPHILS 10.1 (H) 1.8 - 8.0 K/UL    ABS. LYMPHOCYTES 1.9 0.8 - 3.5 K/UL    ABS. MONOCYTES 0.4 0.0 - 1.0 K/UL    ABS. EOSINOPHILS 0.0 0.0 - 0.4 K/UL    ABS. BASOPHILS 0.0 0.0 - 0.1 K/UL    ABS. IMM.  GRANS. 0.0 K/UL    DF Manual      RBC COMMENTS Teardrop cells  1+        RBC COMMENTS Stomatocytes  1+        RBC COMMENTS Polychromasia  1+       METABOLIC PANEL, COMPREHENSIVE    Collection Time: 08/03/21  5:55 AM   Result Value Ref Range Sodium 136 136 - 145 mmol/L    Potassium 4.2 3.5 - 5.1 mmol/L    Chloride 102 97 - 108 mmol/L    CO2 26 21 - 32 mmol/L    Anion gap 8 5 - 15 mmol/L    Glucose 102 (H) 65 - 100 mg/dL    BUN 24 (H) 6 - 20 mg/dL    Creatinine 1.35 (H) 0.70 - 1.30 mg/dL    BUN/Creatinine ratio 18 12 - 20      GFR est AA >60 >60 ml/min/1.73m2    GFR est non-AA 56 (L) >60 ml/min/1.73m2    Calcium 9.4 8.5 - 10.1 mg/dL    Bilirubin, total 1.6 (H) 0.2 - 1.0 mg/dL    AST (SGOT) 36 15 - 37 U/L    ALT (SGPT) 52 12 - 78 U/L    Alk. phosphatase 70 45 - 117 U/L    Protein, total 8.0 6.4 - 8.2 g/dL    Albumin 3.4 (L) 3.5 - 5.0 g/dL    Globulin 4.6 (H) 2.0 - 4.0 g/dL    A-G Ratio 0.7 (L) 1.1 - 2.2     TROPONIN I    Collection Time: 08/03/21  5:55 AM   Result Value Ref Range    Troponin-I, Qt. <0.05 <0.05 ng/mL   BNP    Collection Time: 08/03/21  5:55 AM   Result Value Ref Range    NT pro-BNP 27 <125 pg/mL   LACTIC ACID    Collection Time: 08/03/21  5:55 AM   Result Value Ref Range    Lactic acid 2.0 0.4 - 2.0 mmol/L         Imaging:     Assessment:     Active Problems:    COVID-19 (8/3/2021)      Acute respiratory failure with hypoxia (HCC) (8/3/2021)      Essential hypertension (8/3/2021)      Polycystic kidney disease (8/3/2021)         80-year-old male with a history of hypertension and polycystic kidney disease began with respiratory symptoms 2 weeks ago, confirmed COVID-19 approximately 1 week ago, cough and exertional shortness of breath persist since Medrol Dosepak given in the ED on 7/29/2020. He is hypoxic on presentation with O2 saturation recorded 81%. He looks comfortable at rest.  Chest x-ray consistent with viral pneumonitis. -Acute hypoxic respiratory failure with hypoxia  -Covid19 pneumonitis, not meeting sepsis criteria.  Not previously vaccinated.  -Essential hypertension, above goal  -Polycystic kidney disease    Plan:     -IP admission anticipating at least a 2 night stay  -Not sure of efficacy of Covid directed therapy at this point so will cx ID and pulmonology for further rec's.   -We will start dexamethasone, azithromycin.   -Follow d dimer, ld, ferrtin, procal, crp, vit d  -We will resume his home medication of amlodipine 10 mg confirmed at bedside, add hydralazine IV prn.  -Supportive care  -wean O2 as tolerates.  -Albuterol in q4 prn      VTEP: Lovenox  Full CODE STATUS  NOK:  Name Relation Home Work Juan He Daughter [phone]     Disposition: IP admission anticipating at least a 2 night stay. He is hypoxic on room air on presentation, anticipate when oxygenation improves he could be discharged home. Consulting ID and pulmonology for further recommendations.     Signed By: Mariia Palm MD     August 3, 2021

## 2021-08-03 NOTE — ED NOTES
TRANSFER - OUT REPORT:    Verbal report given to Franklyn Griffiths (name) on Dinorah Livers  being transferred to  (unit) for routine progression of care       Report consisted of patients Situation, Background, Assessment and   Recommendations(SBAR). Information from the following report(s) SBAR and ED Summary was reviewed with the receiving nurse. Lines:   Peripheral IV 08/03/21 Left Antecubital (Active)   Site Assessment Clean, dry, & intact 08/03/21 0548   Phlebitis Assessment 0 08/03/21 0548   Infiltration Assessment 0 08/03/21 0548   Dressing Status Clean, dry, & intact 08/03/21 0548   Dressing Type Transparent 08/03/21 0548   Hub Color/Line Status Pink 08/03/21 0548        Opportunity for questions and clarification was provided.       Patient transported with:   [employer]

## 2021-08-04 LAB
25(OH)D3 SERPL-MCNC: 45.4 NG/ML (ref 30–100)
ALBUMIN SERPL-MCNC: 2.8 G/DL (ref 3.5–5)
ALBUMIN/GLOB SERPL: 0.6 {RATIO} (ref 1.1–2.2)
ALP SERPL-CCNC: 66 U/L (ref 45–117)
ALT SERPL-CCNC: 48 U/L (ref 12–78)
ANION GAP SERPL CALC-SCNC: 7 MMOL/L (ref 5–15)
AST SERPL W P-5'-P-CCNC: 24 U/L (ref 15–37)
BASOPHILS # BLD: 0 K/UL (ref 0–0.1)
BASOPHILS NFR BLD: 0 % (ref 0–1)
BILIRUB SERPL-MCNC: 1.1 MG/DL (ref 0.2–1)
BUN SERPL-MCNC: 21 MG/DL (ref 6–20)
BUN/CREAT SERPL: 18 (ref 12–20)
CA-I BLD-MCNC: 9.1 MG/DL (ref 8.5–10.1)
CHLORIDE SERPL-SCNC: 104 MMOL/L (ref 97–108)
CO2 SERPL-SCNC: 24 MMOL/L (ref 21–32)
CREAT SERPL-MCNC: 1.14 MG/DL (ref 0.7–1.3)
CRP SERPL-MCNC: 12.1 MG/DL (ref 0–0.6)
D DIMER PPP FEU-MCNC: 0.5 UG/ML(FEU)
DIFFERENTIAL METHOD BLD: ABNORMAL
EOSINOPHIL # BLD: 0 K/UL (ref 0–0.4)
EOSINOPHIL NFR BLD: 0 % (ref 0–7)
ERYTHROCYTE [DISTWIDTH] IN BLOOD BY AUTOMATED COUNT: 13.1 % (ref 11.5–14.5)
FERRITIN SERPL-MCNC: 1087 NG/ML (ref 26–388)
FIBRINOGEN PPP-MCNC: 754 MG/DL (ref 220–535)
GLOBULIN SER CALC-MCNC: 4.7 G/DL (ref 2–4)
GLUCOSE SERPL-MCNC: 135 MG/DL (ref 65–100)
HCT VFR BLD AUTO: 44.4 % (ref 36.6–50.3)
HGB BLD-MCNC: 16 G/DL (ref 12.1–17)
IMM GRANULOCYTES # BLD AUTO: 0.3 K/UL (ref 0–0.04)
IMM GRANULOCYTES NFR BLD AUTO: 3 % (ref 0–0.5)
LACTATE SERPL-SCNC: 1.7 MMOL/L (ref 0.4–2)
LDH SERPL L TO P-CCNC: 340 U/L (ref 85–241)
LYMPHOCYTES # BLD: 1 K/UL (ref 0.8–3.5)
LYMPHOCYTES NFR BLD: 9 % (ref 12–49)
MCH RBC QN AUTO: 28.4 PG (ref 26–34)
MCHC RBC AUTO-ENTMCNC: 36 G/DL (ref 30–36.5)
MCV RBC AUTO: 78.9 FL (ref 80–99)
MONOCYTES # BLD: 0.5 K/UL (ref 0–1)
MONOCYTES NFR BLD: 4 % (ref 5–13)
NEUTS SEG # BLD: 9.8 K/UL (ref 1.8–8)
NEUTS SEG NFR BLD: 84 % (ref 32–75)
NRBC # BLD: 0 K/UL (ref 0–0.01)
NRBC BLD-RTO: 0 PER 100 WBC
PLATELET # BLD AUTO: 328 K/UL (ref 150–400)
PMV BLD AUTO: 9.7 FL (ref 8.9–12.9)
POTASSIUM SERPL-SCNC: 4.3 MMOL/L (ref 3.5–5.1)
PROT SERPL-MCNC: 7.5 G/DL (ref 6.4–8.2)
RBC # BLD AUTO: 5.63 M/UL (ref 4.1–5.7)
SODIUM SERPL-SCNC: 135 MMOL/L (ref 136–145)
WBC # BLD AUTO: 11.6 K/UL (ref 4.1–11.1)

## 2021-08-04 PROCEDURE — 94762 N-INVAS EAR/PLS OXIMTRY CONT: CPT

## 2021-08-04 PROCEDURE — 82728 ASSAY OF FERRITIN: CPT

## 2021-08-04 PROCEDURE — 36415 COLL VENOUS BLD VENIPUNCTURE: CPT

## 2021-08-04 PROCEDURE — 82306 VITAMIN D 25 HYDROXY: CPT

## 2021-08-04 PROCEDURE — 80053 COMPREHEN METABOLIC PANEL: CPT

## 2021-08-04 PROCEDURE — 74011250637 HC RX REV CODE- 250/637: Performed by: INTERNAL MEDICINE

## 2021-08-04 PROCEDURE — 85384 FIBRINOGEN ACTIVITY: CPT

## 2021-08-04 PROCEDURE — 77010033678 HC OXYGEN DAILY

## 2021-08-04 PROCEDURE — 74011250636 HC RX REV CODE- 250/636: Performed by: INTERNAL MEDICINE

## 2021-08-04 PROCEDURE — 65270000029 HC RM PRIVATE

## 2021-08-04 PROCEDURE — 94760 N-INVAS EAR/PLS OXIMETRY 1: CPT

## 2021-08-04 PROCEDURE — 99232 SBSQ HOSP IP/OBS MODERATE 35: CPT | Performed by: INTERNAL MEDICINE

## 2021-08-04 PROCEDURE — 83605 ASSAY OF LACTIC ACID: CPT

## 2021-08-04 PROCEDURE — 83615 LACTATE (LD) (LDH) ENZYME: CPT

## 2021-08-04 PROCEDURE — 85025 COMPLETE CBC W/AUTO DIFF WBC: CPT

## 2021-08-04 PROCEDURE — 86140 C-REACTIVE PROTEIN: CPT

## 2021-08-04 PROCEDURE — 85379 FIBRIN DEGRADATION QUANT: CPT

## 2021-08-04 RX ADMIN — AMLODIPINE BESYLATE 10 MG: 5 TABLET ORAL at 08:54

## 2021-08-04 RX ADMIN — OXYCODONE HYDROCHLORIDE AND ACETAMINOPHEN 1000 MG: 500 TABLET ORAL at 21:31

## 2021-08-04 RX ADMIN — Medication 10 ML: at 21:47

## 2021-08-04 RX ADMIN — DEXAMETHASONE 6 MG: 4 TABLET ORAL at 05:20

## 2021-08-04 RX ADMIN — FAMOTIDINE 20 MG: 20 TABLET ORAL at 08:54

## 2021-08-04 RX ADMIN — Medication 2000 UNITS: at 08:54

## 2021-08-04 RX ADMIN — DEXAMETHASONE 6 MG: 4 TABLET ORAL at 21:32

## 2021-08-04 RX ADMIN — Medication 10 ML: at 14:53

## 2021-08-04 RX ADMIN — ENOXAPARIN SODIUM 30 MG: 100 INJECTION SUBCUTANEOUS at 21:31

## 2021-08-04 RX ADMIN — Medication 1 CAPSULE: at 08:54

## 2021-08-04 RX ADMIN — AZITHROMYCIN DIHYDRATE 500 MG: 500 INJECTION, POWDER, LYOPHILIZED, FOR SOLUTION INTRAVENOUS at 08:54

## 2021-08-04 RX ADMIN — DEXAMETHASONE 6 MG: 4 TABLET ORAL at 14:51

## 2021-08-04 RX ADMIN — Medication 10 ML: at 05:20

## 2021-08-04 RX ADMIN — OXYCODONE HYDROCHLORIDE AND ACETAMINOPHEN 1000 MG: 500 TABLET ORAL at 08:54

## 2021-08-04 RX ADMIN — ENOXAPARIN SODIUM 30 MG: 100 INJECTION SUBCUTANEOUS at 08:54

## 2021-08-04 RX ADMIN — FAMOTIDINE 20 MG: 20 TABLET ORAL at 21:31

## 2021-08-04 NOTE — CONSULTS
Full consultation dictated. [id number]. Patient was evaluated earlier today. COVID-19 pneumonia with extensive bilateral infiltrates and hypoxia. I believe that he is a good candidate for remdesivir. I will further discuss with ID. We will check inflammatory markers and D-dimer in a couple of days. Continue with Lovenox prophylactic doses. We will add vitamin C and zinc.  Thank you.

## 2021-08-04 NOTE — PROGRESS NOTES
Hospitalist Progress Note               Daily Progress Note: 64/2021  55-year-old male with a history of hypertension and PKD presents with increased shortness of breath with a known positive Covid19 testing imaging consistent with Covid pneumonia. Symptoms for 2 weeks, tested positive a week ago. ID and pulmonology consulted. Azithromycin, remdesivir, Actemra, dexamethasone initiated. Subjective: The patient is seen for follow  up. States that he feels \"30% better\"  Still exertionally dyspneic but not coughing as much.   ID and pulmonology appreciated    Problem List:  Problem List as of 8/4/2021 Never Reviewed        Codes Class Noted - Resolved    [id number] ICD-10-CM: U07.1  ICD-9-CM: 079.89  8/3/2021 - Present        Acute respiratory failure with hypoxia [facility]) ICD-10-CM: J96.01  ICD-9-CM: 518.81  8/3/2021 - Present        Essential hypertension ICD-10-CM: I10  ICD-9-CM: 401.9  8/3/2021 - Present        Polycystic kidney disease ICD-10-CM: Q61.3  ICD-9-CM: 753.12  8/3/2021 - Present              Medications reviewed  Current Facility-Administered Medications   Medication Dose Route Frequency    enoxaparin (LOVENOX) injection 30 mg  30 mg SubCUTAneous Q12H    acetaminophen (TYLENOL) tablet 650 mg  650 mg Oral Q6H PRN    Or    acetaminophen (TYLENOL) suppository 650 mg  650 mg Rectal Q6H PRN    dexAMETHasone (DECADRON) tablet 6 mg  6 mg Oral Q8H    cholecalciferol (VITAMIN D3) (1000 Units /25 mcg) tablet 2,000 Units  2,000 Units Oral DAILY    albuterol (PROVENTIL HFA, VENTOLIN HFA, PROAIR HFA) inhaler 2 Puff  2 Puff Inhalation Q4H PRN    azithromycin (ZITHROMAX) 500 mg in 0.9% sodium chloride 250 mL (VIAL-MATE)  500 mg IntraVENous Q24H    HYDROcodone-chlorpheniramine (TUSSIONEX) oral suspension 5 mL  5 mL Oral Q12H PRN    famotidine (PEPCID) tablet 20 mg  20 mg Oral BID    sodium chloride (NS) flush 5-40 mL  5-40 mL IntraVENous Q8H    sodium chloride (NS) flush 5-40 mL  5-40 mL IntraVENous PRN  polyethylene glycol (MIRALAX) packet 17 g  17 g Oral DAILY PRN    ondansetron (ZOFRAN ODT) tablet 4 mg  4 mg Oral Q8H PRN    Or    ondansetron (ZOFRAN) injection 4 mg  4 mg IntraVENous Q6H PRN    amLODIPine (NORVASC) tablet 10 mg  10 mg Oral DAILY    hydrALAZINE (APRESOLINE) 20 mg/mL injection 10 mg  10 mg IntraVENous Q6H PRN    ascorbic acid (vitamin C) (VITAMIN C) tablet 1,000 mg  1,000 mg Oral BID    zinc sulfate (ZINCATE) 50 mg zinc (220 mg) capsule 1 Capsule  1 Capsule Oral DAILY       Review of Systems:   Review of Systems   Constitutional: Positive for malaise/fatigue. Negative for chills and fever. HENT: Negative. Eyes: Negative. Respiratory: Positive for cough, sputum production and shortness of breath. Negative for hemoptysis and wheezing. Cardiovascular: Negative for chest pain and leg swelling. Gastrointestinal: Negative. Genitourinary: Negative. Musculoskeletal: Positive for myalgias. Skin: Negative. Neurological: Negative. Endo/Heme/Allergies: Negative. Psychiatric/Behavioral: Negative. Objective:   Physical Exam:     Visit Vitals  BP (!) 141/94 (BP 1 Location: Right upper arm)   Pulse 87   Temp 97.9 °F (36.6 °C)   Resp 18   Ht 6' 2\" (1.88 m)   Wt 125.6 kg (277 lb)   SpO2 94%   BMI 35.56 kg/m²    O2 Flow Rate (L/min): 6 l/min O2 Device: Nasal cannula    Temp (24hrs), Av.8 °F (36.6 °C), Min:97.6 °F (36.4 °C), Max:98.1 °F (36.7 °C)     0701 -  1900  In: -   Out: 514 [Urine:475]   No intake/output data recorded. General:  Alert, cooperative, no distress, appears stated age. Lungs:   Clear to auscultation bilaterally. Chest wall:  No tenderness or deformity. Heart:  Regular rate and rhythm, S1, S2 normal, no murmur, click, rub or gallop. Abdomen:   Soft, non-tender. Bowel sounds normal. No masses,  No organomegaly. Extremities: Extremities normal, atraumatic, no cyanosis or edema. Pulses: 2+ and symmetric all extremities.    Skin: Skin color, texture, turgor normal. No rashes or lesions   Neurologic: CNII-XII intact. No gross sensory or motor deficits     Data Review:       Recent Days:  Recent Labs     08/04/21  0415 08/03/21  0555   WBC 11.6* 13.6*   HGB 16.0 16.8   HCT 44.4 45.8    309     Recent Labs     08/04/21  0415 08/03/21  0555   * 136   K 4.3 4.2    102   CO2 24 26   * 102*   BUN 21* 24*   CREA 1.14 1.35*   CA 9.1 9.4   ALB 2.8* 3.4*   TBILI 1.1* 1.6*   ALT 48 52     No results for input(s): PH, PCO2, PO2, HCO3, FIO2 in the last 72 hours. 24 Hour Results:  Recent Results (from the past 24 hour(s))   CBC WITH AUTOMATED DIFF    Collection Time: 08/04/21  4:15 AM   Result Value Ref Range    WBC 11.6 (H) 4.1 - 11.1 K/uL    RBC 5.63 4.10 - 5.70 M/uL    HGB 16.0 12.1 - 17.0 g/dL    HCT 44.4 36.6 - 50.3 %    MCV 78.9 (L) 80.0 - 99.0 FL    MCH 28.4 26.0 - 34.0 PG    MCHC 36.0 30.0 - 36.5 g/dL    RDW 13.1 11.5 - 14.5 %    PLATELET 880 861 - 500 K/uL    MPV 9.7 8.9 - 12.9 FL    NRBC 0.0 0.0  WBC    ABSOLUTE NRBC 0.00 0.00 - 0.01 K/uL    NEUTROPHILS 84 (H) 32 - 75 %    LYMPHOCYTES 9 (L) 12 - 49 %    MONOCYTES 4 (L) 5 - 13 %    EOSINOPHILS 0 0 - 7 %    BASOPHILS 0 0 - 1 %    IMMATURE GRANULOCYTES 3 (H) 0 - 0.5 %    ABS. NEUTROPHILS 9.8 (H) 1.8 - 8.0 K/UL    ABS. LYMPHOCYTES 1.0 0.8 - 3.5 K/UL    ABS. MONOCYTES 0.5 0.0 - 1.0 K/UL    ABS. EOSINOPHILS 0.0 0.0 - 0.4 K/UL    ABS. BASOPHILS 0.0 0.0 - 0.1 K/UL    ABS. IMM.  GRANS. 0.3 (H) 0.00 - 0.04 K/UL    DF AUTOMATED     METABOLIC PANEL, COMPREHENSIVE    Collection Time: 08/04/21  4:15 AM   Result Value Ref Range    Sodium 135 (L) 136 - 145 mmol/L    Potassium 4.3 3.5 - 5.1 mmol/L    Chloride 104 97 - 108 mmol/L    CO2 24 21 - 32 mmol/L    Anion gap 7 5 - 15 mmol/L    Glucose 135 (H) 65 - 100 mg/dL    BUN 21 (H) 6 - 20 mg/dL    Creatinine 1.14 0.70 - 1.30 mg/dL    BUN/Creatinine ratio 18 12 - 20      GFR est AA >60 >60 ml/min/1.73m2    GFR est non-AA >60 >60 ml/min/1.73m2    Calcium 9.1 8.5 - 10.1 mg/dL    Bilirubin, total 1.1 (H) 0.2 - 1.0 mg/dL    AST (SGOT) 24 15 - 37 U/L    ALT (SGPT) 48 12 - 78 U/L    Alk. phosphatase 66 45 - 117 U/L    Protein, total 7.5 6.4 - 8.2 g/dL    Albumin 2.8 (L) 3.5 - 5.0 g/dL    Globulin 4.7 (H) 2.0 - 4.0 g/dL    A-G Ratio 0.6 (L) 1.1 - 2.2     FIBRINOGEN    Collection Time: 08/04/21  4:15 AM   Result Value Ref Range    Fibrinogen 754 (H) 220 - 535 mg/dL   C REACTIVE PROTEIN, QT    Collection Time: 08/04/21  4:15 AM   Result Value Ref Range    C-Reactive protein 12.10 (H) 0.00 - 0.60 mg/dL   LD    Collection Time: 08/04/21  4:15 AM   Result Value Ref Range     (H) 85 - 241 U/L   D DIMER    Collection Time: 08/04/21  4:15 AM   Result Value Ref Range    D DIMER 0.50 (H) <0.50 ug/ml(FEU)   LACTIC ACID    Collection Time: 08/04/21  4:15 AM   Result Value Ref Range    Lactic acid 1.7 0.4 - 2.0 mmol/L   VITAMIN D, 25 HYDROXY    Collection Time: 08/04/21  4:15 AM   Result Value Ref Range    Vitamin D 25-Hydroxy 45.4 30 - 100 ng/mL           Assessment/     27-year-old male with a history of hypertension and polycystic kidney disease began with respiratory symptoms 2 weeks ago, confirmed COVID-19 approximately 1 week ago, cough and exertional shortness of breath persist since Medrol Dosepak given in the ED on 7/29/2020. He is hypoxic on presentation with O2 saturation recorded 81%. He looks comfortable at rest.  Chest x-ray consistent with viral pneumonitis. Admitted 2/2 covid pneumonia and acute hypoxic respiratory failure.     -Acute hypoxic respiratory failure with hypoxia   -Covid19 pneumonitis, not meeting sepsis criteria. Not previously vaccinated. Received initial remdesivir/actemra. Continue dexamethasione.  -Essential hypertension,  Remains above goal  -Polycystic kidney disease     Plan:      -Cont per ID and pulmonology.   -remdesivir/dexamethasone/azithromycin and received 1 dose actemra 8/4  -Wean o2 as tolerated to maintain sats>91%  -cont amlodipine + prn hydralazine  -Albuterol in q4 prn        VTEP: Lovenox  Full CODE STATUS  NOK:  Name Relation Home Work Juan He Daughter [phone]      Disposition: Pending course, day 2/5 remdesivir, received actemra, barrier will be o2 supplement. Care Plan discussed with: Patient/Family, Nurse,  and Consultant . Total time spent with patient: 30 minutes. This dictation was done by dragon, computer voice recognition software. Often unanticipated grammatical, syntax, phones and other interpretive errors are inadvertently transcribed. Please excuse errors that have escaped final proofreading.     Paco Gandara MD

## 2021-08-04 NOTE — PROGRESS NOTES
Progress Note    Patient: Nicky Adams MRN: [id number]  SSN: [id number]    YOB: 1970  Age: 48 y.o. Sex: male      Admit Date: 8/3/2021    LOS: 1 day     Subjective:   Patient followed for Covid-19 pneumonia with hypoxia. Started on Dexamethasone and given Actemra, however, Remdesivir was denied. He remains afebrile with leukocytosis, elevated LDH and CRP but normal procal.  Blood cultures negative so far. He is also on Azithromycin. He remains on nasal O2. No CXR today. Objective:     Vitals:    08/04/21 0400 08/04/21 0720 08/04/21 0800 08/04/21 0813   BP:  (!) 143/97     Pulse: 64 68 67    Resp:  20     Temp:  97.6 °F (36.4 °C)     SpO2:  97%  92%   Weight:       Height:            Intake and Output:  Current Shift: No intake/output data recorded. Last three shifts: No intake/output data recorded. Physical Exam:   Vitals and nursing note reviewed. Constitutional:       Appearance: He is ill-appearing. HENT: Nasal O2 6L/min  Cardiovascular:      Rate and Rhythm: Normal rate and regular rhythm. Heart sounds: No murmur heard. Pulmonary:      Effort: Pulmonary effort is normal.      Breath sounds: Normal breath sounds. Genitourinary:     Comments: No Adamson  Skin:     Findings: No rash. Neurological:      General: No focal deficit present. Mental Status: He is alert and oriented to person, place, and time. Psychiatric:         Mood and Affect: Mood normal.         Behavior: Behavior normal.         Thought Content: Thought content normal.         Judgment: Judgment normal.      Lab/Data Review:     WBC 11,600      Ferritin pending    Procal <0.05  CRP 12.10 <10.80    Blood cultures (8/3) No growth  Assessment:     Active Problems:    COVID-19 (8/3/2021)      Acute respiratory failure with hypoxia ([address] (8/3/2021)      Essential hypertension (8/3/2021)      Polycystic kidney disease (8/3/2021)    1.  Covid-19 pneumonia, moderately severe, Day #2 Dexamethasone, status post Actemra, Remdesivir denied  2. Acute hypoxic respiratory failure on nasal O2  3. Leukocytosis, likely secondary to Dexamethasone  4. Elevated LDH and CRP, secondary to #1    Plan:   1. Continue IV Dexamethasone  2. Administer Actemra 800 mg IV  3.  In am, repeat CRP and LDH       Signed By: Ceceila Dominguez MD     August 4, 2021

## 2021-08-04 NOTE — CONSULTS
[address] [place] [address]    Name:  Dalila Frias  MR#:  [id number]  :  1970  ACCOUNT #:  [de-identified]  DATE OF SERVICE:  2021      ATTENDING PHYSICIAN:  Dr. Dumont Laser:  COVID-19 pneumonia and hypoxia. HISTORY OF PRESENT ILLNESS:  The patient is a pleasant 70-year-old somewhat obese  male. He denies any history of tobacco abuse. He denies any respiratory problems to begin with. He has not been vaccinated against COVID-19 yet. He does not take yearly flu vaccines. He does have a history of hypertension and polycystic kidney disease. The patient states that he developed symptoms around 2021. Apparently, he went to [facility] and was not tested for COVID-19. He was told that he has some allergies going on. His symptoms progressed and he presented again. At this time, he was tested and was positive. Chest x-ray showed bilateral infiltrates. He was asked to go home and keep himself in quarantine. He was given antibiotics and prednisone and inhaler. He was not hypoxic at that time. However, he continued to deteriorate. His oxygen levels were going down. He then presented at this time to the hospital.  Saturation was 81% on room air. He is now on 6 liters of oxygen and feels better. On further questioning, he has been coughing up some purulent sputum. He had headaches which have improved. He had some alteration in his sense of taste and smell which is coming back. Diarrhea, he thinks, was from the antibiotics from [facility].  He is not in any distress at this time. He has some fatigue and body aches. PAST MEDICAL HISTORY:  Significant for hypertension and polycystic kidney disease. PAST SURGICAL HISTORY:  Insignificant. ALLERGIES:  NO KNOWN DRUG ALLERGIES.     MEDICATIONS:  Currently, the patient is started on amlodipine 10 mg p.o. daily, azithromycin 500 mg IV daily, vitamin D p.o. daily, dexamethasone 6 mg IV q.8 hourly, Lovenox 30 mg subcutaneously q. 12 hourly, Pepcid 20 mg p.o. b.i.d., and other p.r.n. medications. SOCIAL HISTORY:  He lives with his family. He denies significant drug, alcohol, or tobacco abuse. He may occasionally drink. He does admit to smoking a cigar couple of times in a week. 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 FAMILY HISTORY:  His father has a history of stroke and sleep apnea. REVIEW OF SYSTEMS:  Complete review of systems was undertaken. Pertinent findings are discussed above. All other systems were reviewed and are negative. PHYSICAL EXAMINATION:  GENERAL:  The patient is a young [employer] male who does not appear to be in any distress. VITAL SIGNS:  Temperature is 98.3, pulse is 84 per minute, respiratory rate is 20 per minute, and blood pressure is 144/97. He is on 6 liters of oxygen. It should be mentioned that he had felt feverish before but does not have a temperature now. HEENT:  Shows pupils are equal and reactive to light. No pallor or icterus. Nasal passages are patent. Oropharynx is without thrush. NECK:  Supple. Trachea is central.  No JVD or lymphadenopathy. He is not using accessory muscles of respiration. CHEST:  Symmetrical with equal and fair air entry bilaterally. He does have some basilar inspiratory crackles. No rhonchi or wheezing. No chest wall tenderness. HEART:  Rhythm is regular without any rub, murmur, or gallop. ABDOMEN:  Soft and benign. Bowel sounds are audible. No masses or organomegaly. EXTREMITIES:  Did not show any cyanosis or clubbing. No pitting edema. Pulses are palpable. NEUROLOGIC:  Grossly intact. SKIN:  Warm and dry. LABORATORY DATA:  Portable chest x-ray done today on 08/03/2021 was compared to a prior study of 07/29/2021. He has somewhat patchy infiltrates involving all lobes of both lungs.   WBC count is 13.6 with neutrophils of 73%, hemoglobin is 16.8, platelet count is 766. Troponin is negative. BNP is 27. Basic metabolic profile is mostly within normal limits. Creatinine is 1.35. CRP is elevated at 10.8. D-dimer is 0.51.    ASSESSMENT AND PLAN:  1. The patient has COVID-19 pneumonia. He has extensive bilateral somewhat patchy infiltration involving all lobes of both lungs. He is hypoxic requiring 6 liters of oxygen. His symptoms began on 07/25/2021 approximately. I believe that he is a good candidate for remdesivir. Infectious Disease is consulted and I will further discuss with them. Continue with Decadron 6 mg p.o. q.8 hourly at this time along with azithromycin IV. May consider adding IV Rocephin. I will add vitamin C and zinc.  2.  Mildly elevated D-dimer. Continue with Lovenox 30 mg subcutaneously q.8 hourly. No need for therapeutic conversion. We will check repeat D-dimer in a couple of days. 3.  Hypertension. 4.  Polycystic kidney disease. Continue to monitor kidney function closely particularly if remdesivir is initiated. Thank you for allowing me to participate in the care of this patient. I will follow the patient closely with you.       MD DONNA [email or web address]  D:  08/03/2021 20:06  T:  08/03/2021 23:35  JOB #:  [id number]

## 2021-08-04 NOTE — PROGRESS NOTES
Pulmonary Progress Note      NAME: Elvis Cherry   :  1970  MRM:  [id number]    Date/Time: 2021  7:34 PM         Subjective:     Discussed with the RN. Patient has been resting comfortably. However remains on 6 L oxygen. Some sputum production. Appetite is okay. Malaise is about the same. He is to get Actemra as per ID consultation. Remdesivir was declined. Past Medical History reviewed and unchanged from Admission History and Physical       Objective:     Physical Exam     Vitals:      Last 24hrs VS reviewed since prior progress note. Most recent are:    Visit Vitals  BP (!) 139/93 (BP 1 Location: Left upper arm, BP Patient Position: At rest;Prone)   Pulse 96   Temp 98.5 °F (36.9 °C)   Resp 18   Ht 6' 2\" (1.88 m)   Wt 125.6 kg (277 lb)   SpO2 95%   BMI 35.56 kg/m²     SpO2 Readings from Last 6 Encounters:   21 95%   21 95%    O2 Flow Rate (L/min): 6 l/min       Intake/Output Summary (Last 24 hours) at 2021 193  Last data filed at 2021 1639  Gross per 24 hour   Intake    Output 475 ml   Net -475 ml      PHYSICAL EXAMINATION:  GENERAL:  The patient is a young [facility] [employer] male who does not appear to be in any distress. He is on 6 liters of oxygen. HEENT:  Shows pupils are equal and reactive to light. No pallor or icterus. Nasal passages are patent. Oropharynx is without thrush. NECK:  Supple. Trachea is central.  No JVD or lymphadenopathy. He is not using accessory muscles of respiration. CHEST:  Symmetrical with equal and fair air entry bilaterally. He does have some basilar inspiratory crackles. No rhonchi or wheezing. No chest wall tenderness. HEART:  Rhythm is regular without any rub, murmur, or gallop. ABDOMEN:  Soft and benign. Bowel sounds are audible. No masses or organomegaly. EXTREMITIES:  Did not show any cyanosis or clubbing. No pitting edema. Pulses are palpable. NEUROLOGIC:  Grossly intact. SKIN:  Warm and dry.     XR CHEST PORT Final Result   Diffuse airspace opacities in the lungs compatible with Covid-19   pneumonia, not significant changed.              Lab Data Reviewed: (see below)      Medications:  Current Facility-Administered Medications   Medication Dose Route Frequency    enoxaparin (LOVENOX) injection 30 mg  30 mg SubCUTAneous Q12H    acetaminophen (TYLENOL) tablet 650 mg  650 mg Oral Q6H PRN    Or    acetaminophen (TYLENOL) suppository 650 mg  650 mg Rectal Q6H PRN    dexAMETHasone (DECADRON) tablet 6 mg  6 mg Oral Q8H    cholecalciferol (VITAMIN D3) (1000 Units /25 mcg) tablet 2,000 Units  2,000 Units Oral DAILY    albuterol (PROVENTIL HFA, VENTOLIN HFA, PROAIR HFA) inhaler 2 Puff  2 Puff Inhalation Q4H PRN    azithromycin (ZITHROMAX) 500 mg in 0.9% sodium chloride 250 mL (VIAL-MATE)  500 mg IntraVENous Q24H    HYDROcodone-chlorpheniramine (TUSSIONEX) oral suspension 5 mL  5 mL Oral Q12H PRN    famotidine (PEPCID) tablet 20 mg  20 mg Oral BID    sodium chloride (NS) flush 5-40 mL  5-40 mL IntraVENous Q8H    sodium chloride (NS) flush 5-40 mL  5-40 mL IntraVENous PRN    polyethylene glycol (MIRALAX) packet 17 g  17 g Oral DAILY PRN    ondansetron (ZOFRAN ODT) tablet 4 mg  4 mg Oral Q8H PRN    Or    ondansetron (ZOFRAN) injection 4 mg  4 mg IntraVENous Q6H PRN    amLODIPine (NORVASC) tablet 10 mg  10 mg Oral DAILY    hydrALAZINE (APRESOLINE) 20 mg/mL injection 10 mg  10 mg IntraVENous Q6H PRN    ascorbic acid (vitamin C) (VITAMIN C) tablet 1,000 mg  1,000 mg Oral BID    zinc sulfate (ZINCATE) 50 mg zinc (220 mg) capsule 1 Capsule  1 Capsule Oral DAILY       ______________________________________________________________________      Lab Review:     Recent Labs     08/04/21  0415 08/03/21  0555   WBC 11.6* 13.6*   HGB 16.0 16.8   HCT 44.4 45.8    309     Recent Labs     08/04/21  0415 08/03/21  0555   * 136   K 4.3 4.2    102   CO2 24 26   * 102*   BUN 21* 24*   CREA 1.14 1.35*   CA 9.1 9.4   ALB 2.8* 3.4*   ALT 48 52     No components found for: GLPOC  No results for input(s): PH, PCO2, PO2, HCO3, FIO2 in the last 72 hours. No results for input(s): INR, INREXT, INREXT in the last 72 hours. Other pertinent lab:   Portable chest x-ray done today on 08/03/2021 was compared to a prior study of 07/29/2021. He has somewhat patchy infiltrates involving all lobes of both lungs. CRP is elevated at 10.8. D-dimer is 0.51. Assessment & Plan:      1. The patient has COVID-19 pneumonia. He has extensive bilateral somewhat patchy infiltration involving all lobes of both lungs. He is hypoxic requiring 6 liters of oxygen. His symptoms began on 07/25/2021 approximately. I believe that he is a good candidate for remdesivir. However it appears that it was declined by the hospital pharmacy. Infectious Disease is consulted and they are planning to give Actemra. Continue with Decadron 6 mg p.o. q.8 hourly at this time along with azithromycin IV. May consider adding IV Rocephin. Continue vitamin C and zinc.  It should be mentioned that the patient did not get COVID-19 vaccine. 2.  Mildly elevated D-dimer. Continue with Lovenox 30 mg subcutaneously q.8 hourly. No need for therapeutic conversion. We will check repeat D-dimer in a couple of days. 3.  Hypertension. 4.  Polycystic kidney disease. Continue to monitor kidney function closely. Thank you for allowing me to participate in the care of this patient. I will follow the patient closely with you.   Discussed with the nursing staff        Ginger Jay MD

## 2021-08-05 LAB
ALBUMIN SERPL-MCNC: 2.8 G/DL (ref 3.5–5)
ALBUMIN/GLOB SERPL: 0.6 {RATIO} (ref 1.1–2.2)
ALP SERPL-CCNC: 88 U/L (ref 45–117)
ALT SERPL-CCNC: 48 U/L (ref 12–78)
ANION GAP SERPL CALC-SCNC: 6 MMOL/L (ref 5–15)
AST SERPL W P-5'-P-CCNC: 21 U/L (ref 15–37)
BASOPHILS # BLD: 0 K/UL (ref 0–0.1)
BASOPHILS NFR BLD: 0 % (ref 0–1)
BILIRUB SERPL-MCNC: 0.8 MG/DL (ref 0.2–1)
BUN SERPL-MCNC: 24 MG/DL (ref 6–20)
BUN/CREAT SERPL: 22 (ref 12–20)
CA-I BLD-MCNC: 9.1 MG/DL (ref 8.5–10.1)
CHLORIDE SERPL-SCNC: 103 MMOL/L (ref 97–108)
CO2 SERPL-SCNC: 24 MMOL/L (ref 21–32)
CREAT SERPL-MCNC: 1.08 MG/DL (ref 0.7–1.3)
CRP SERPL-MCNC: 7.99 MG/DL (ref 0–0.6)
D DIMER PPP FEU-MCNC: 0.4 UG/ML(FEU)
DIFFERENTIAL METHOD BLD: ABNORMAL
EOSINOPHIL # BLD: 0 K/UL (ref 0–0.4)
EOSINOPHIL NFR BLD: 0 % (ref 0–7)
ERYTHROCYTE [DISTWIDTH] IN BLOOD BY AUTOMATED COUNT: 13.2 % (ref 11.5–14.5)
FERRITIN SERPL-MCNC: 1057 NG/ML (ref 26–388)
GLOBULIN SER CALC-MCNC: 4.4 G/DL (ref 2–4)
GLUCOSE SERPL-MCNC: 140 MG/DL (ref 65–100)
HCT VFR BLD AUTO: 43.6 % (ref 36.6–50.3)
HGB BLD-MCNC: 16 G/DL (ref 12.1–17)
IMM GRANULOCYTES # BLD AUTO: 0.4 K/UL (ref 0–0.04)
IMM GRANULOCYTES NFR BLD AUTO: 2 % (ref 0–0.5)
LDH SERPL L TO P-CCNC: 366 U/L (ref 85–241)
LYMPHOCYTES # BLD: 1.1 K/UL (ref 0.8–3.5)
LYMPHOCYTES NFR BLD: 6 % (ref 12–49)
MCH RBC QN AUTO: 28.9 PG (ref 26–34)
MCHC RBC AUTO-ENTMCNC: 36.7 G/DL (ref 30–36.5)
MCV RBC AUTO: 78.7 FL (ref 80–99)
MONOCYTES # BLD: 1.1 K/UL (ref 0–1)
MONOCYTES NFR BLD: 6 % (ref 5–13)
NEUTS SEG # BLD: 16.3 K/UL (ref 1.8–8)
NEUTS SEG NFR BLD: 86 % (ref 32–75)
NRBC # BLD: 0 K/UL (ref 0–0.01)
NRBC BLD-RTO: 0 PER 100 WBC
PLATELET # BLD AUTO: 398 K/UL (ref 150–400)
PMV BLD AUTO: 9.7 FL (ref 8.9–12.9)
POTASSIUM SERPL-SCNC: 4.3 MMOL/L (ref 3.5–5.1)
PROCALCITONIN SERPL-MCNC: <0.05 NG/ML
PROT SERPL-MCNC: 7.2 G/DL (ref 6.4–8.2)
RBC # BLD AUTO: 5.54 M/UL (ref 4.1–5.7)
SODIUM SERPL-SCNC: 133 MMOL/L (ref 136–145)
WBC # BLD AUTO: 18.9 K/UL (ref 4.1–11.1)

## 2021-08-05 PROCEDURE — 85025 COMPLETE CBC W/AUTO DIFF WBC: CPT

## 2021-08-05 PROCEDURE — 94760 N-INVAS EAR/PLS OXIMETRY 1: CPT

## 2021-08-05 PROCEDURE — 84145 PROCALCITONIN (PCT): CPT

## 2021-08-05 PROCEDURE — 74011250636 HC RX REV CODE- 250/636: Performed by: INTERNAL MEDICINE

## 2021-08-05 PROCEDURE — 77010033678 HC OXYGEN DAILY

## 2021-08-05 PROCEDURE — 86140 C-REACTIVE PROTEIN: CPT

## 2021-08-05 PROCEDURE — 99232 SBSQ HOSP IP/OBS MODERATE 35: CPT | Performed by: INTERNAL MEDICINE

## 2021-08-05 PROCEDURE — 83615 LACTATE (LD) (LDH) ENZYME: CPT

## 2021-08-05 PROCEDURE — 80053 COMPREHEN METABOLIC PANEL: CPT

## 2021-08-05 PROCEDURE — 82728 ASSAY OF FERRITIN: CPT

## 2021-08-05 PROCEDURE — 85379 FIBRIN DEGRADATION QUANT: CPT

## 2021-08-05 PROCEDURE — 74011250637 HC RX REV CODE- 250/637: Performed by: INTERNAL MEDICINE

## 2021-08-05 PROCEDURE — 65270000029 HC RM PRIVATE

## 2021-08-05 PROCEDURE — 36415 COLL VENOUS BLD VENIPUNCTURE: CPT

## 2021-08-05 RX ORDER — GUAIFENESIN 600 MG/1
1200 TABLET, EXTENDED RELEASE ORAL 2 TIMES DAILY
Status: DISCONTINUED | OUTPATIENT
Start: 2021-08-05 | End: 2021-08-07 | Stop reason: HOSPADM

## 2021-08-05 RX ADMIN — GUAIFENESIN 1200 MG: 600 TABLET, EXTENDED RELEASE ORAL at 17:48

## 2021-08-05 RX ADMIN — Medication 10 ML: at 21:35

## 2021-08-05 RX ADMIN — HYDRALAZINE HYDROCHLORIDE 10 MG: 20 INJECTION INTRAMUSCULAR; INTRAVENOUS at 15:50

## 2021-08-05 RX ADMIN — DEXAMETHASONE 6 MG: 4 TABLET ORAL at 05:29

## 2021-08-05 RX ADMIN — Medication 1 CAPSULE: at 10:41

## 2021-08-05 RX ADMIN — OXYCODONE HYDROCHLORIDE AND ACETAMINOPHEN 1000 MG: 500 TABLET ORAL at 10:41

## 2021-08-05 RX ADMIN — FAMOTIDINE 20 MG: 20 TABLET ORAL at 21:24

## 2021-08-05 RX ADMIN — FAMOTIDINE 20 MG: 20 TABLET ORAL at 10:41

## 2021-08-05 RX ADMIN — HYDROCODONE POLISTIREX AND CHLORPHENIRAMINE POLISTIREX 5 ML: 10; 8 SUSPENSION, EXTENDED RELEASE ORAL at 05:50

## 2021-08-05 RX ADMIN — AZITHROMYCIN DIHYDRATE 500 MG: 500 INJECTION, POWDER, LYOPHILIZED, FOR SOLUTION INTRAVENOUS at 10:40

## 2021-08-05 RX ADMIN — DEXAMETHASONE 6 MG: 4 TABLET ORAL at 21:24

## 2021-08-05 RX ADMIN — GUAIFENESIN 1200 MG: 600 TABLET, EXTENDED RELEASE ORAL at 21:23

## 2021-08-05 RX ADMIN — Medication 10 ML: at 05:30

## 2021-08-05 RX ADMIN — ENOXAPARIN SODIUM 30 MG: 100 INJECTION SUBCUTANEOUS at 21:28

## 2021-08-05 RX ADMIN — AMLODIPINE BESYLATE 10 MG: 5 TABLET ORAL at 10:41

## 2021-08-05 RX ADMIN — ENOXAPARIN SODIUM 30 MG: 100 INJECTION SUBCUTANEOUS at 10:40

## 2021-08-05 RX ADMIN — Medication 10 ML: at 15:56

## 2021-08-05 RX ADMIN — HYDROCODONE POLISTIREX AND CHLORPHENIRAMINE POLISTIREX 5 ML: 10; 8 SUSPENSION, EXTENDED RELEASE ORAL at 15:51

## 2021-08-05 RX ADMIN — DEXAMETHASONE 6 MG: 4 TABLET ORAL at 15:49

## 2021-08-05 RX ADMIN — Medication 2000 UNITS: at 10:40

## 2021-08-05 RX ADMIN — HYDRALAZINE HYDROCHLORIDE 10 MG: 20 INJECTION INTRAMUSCULAR; INTRAVENOUS at 05:47

## 2021-08-05 RX ADMIN — OXYCODONE HYDROCHLORIDE AND ACETAMINOPHEN 1000 MG: 500 TABLET ORAL at 21:23

## 2021-08-05 NOTE — PROGRESS NOTES
Progress Note    Patient: Deangelo Gaming MRN: [id number]  SSN: [id number]    YOB: 1970  Age: 48 y.o. Sex: male      Admit Date: 8/3/2021    LOS: 2 days     Subjective:   Patient followed for Covid-19 pneumonia with hypoxia, on Dexamethasone and given Actemra, however, Remdesivir was denied. He remains afebrile with leukocytosis, elevated LDH and CRP which is decreasing. O2 requirement also decreasing. Blood cultures negative so far. He is also on Azithromycin. Patient lying in bed, states that he is still SOB on exertion. No significant cough. Objective:     Vitals:    08/05/21 0800 08/05/21 0912 08/05/21 0934 08/05/21 0935   BP:  (!) 144/93     Pulse: 84 70     Resp:  18     Temp:  98.3 °F (36.8 °C)     SpO2:  94% 94% 94%   Weight:       Height:            Intake and Output:  Current Shift: No intake/output data recorded. Last three shifts: 08/03 1901 - 08/05 0700  In: -   Out: 1175 [Urine:1175]    Physical Exam:   Vitals and nursing note reviewed. Constitutional:       Appearance: He is ill-appearing. HENT: Nasal O2 3L/min  Cardiovascular:      Rate and Rhythm: Normal rate and regular rhythm. Heart sounds: No murmur heard. Pulmonary:      Effort: Pulmonary effort is normal.      Breath sounds: Normal breath sounds. Genitourinary:     Comments: No [place]  Skin:     Findings: No rash. Neurological:      General: No focal deficit present. Mental Status: He is alert and oriented to person, place, and time. Psychiatric:         Mood and Affect: Mood normal.         Behavior: Behavior normal.         Thought Content:  Thought content normal.         Judgment: Judgment normal.      Lab/Data Review:     WBC 18,900     <340  Ferritin 1,087    Procal <0.05  CRP 7.99 <12.10 <10.80    Blood cultures (8/3) No growth 1 day  Assessment:     Active Problems:    COVID-19 (8/3/2021)      Acute respiratory failure with hypoxia ([address] (8/3/2021)      Essential hypertension (8/3/2021)      Polycystic kidney disease (8/3/2021)    1. Covid-19 pneumonia, moderately severe, Day #3 Dexamethasone, status post Actemra, Remdesivir denied  2. Acute hypoxic respiratory failure on nasal O2  3. Leukocytosis, likely secondary to Dexamethasone  4. Elevated LDH and CRP, secondary to #1    Comment:  CRP decreasing, LDH increased. Plan:   1. Continue IV Dexamethasone  2.  In am, repeat CRP, ferritin and LDH       Signed By: Matthew Juárez MD     August 5, 2021

## 2021-08-05 NOTE — PROGRESS NOTES
Hospitalist Progress Note               Daily Progress Note: 65/2021  59-year-old male with a history of hypertension and PKD presents with increased shortness of breath with a known positive Covid19 testing imaging consistent with Covid pneumonia. Symptoms for 2 weeks, tested positive a week ago. ID and pulmonology consulted. Azithromycin, remdesivir, Actemra, dexamethasone initiated. Subjective: The patient is seen for follow  up. O2 down to 3 l/m today with sao2 now ~95%. Pt states feels more fatigued an is worried about going home too soon, reassured him. Cough now not productive. Afebrile.     Still exertionally dyspneic though some improvement  ID and pulmonology appreciated    Problem List:  Problem List as of 8/5/2021 Never Reviewed        Codes Class Noted - Resolved    COVID-19 ICD-10-CM: U07.1  ICD-9-CM: 079.89  8/3/2021 - Present        Acute respiratory failure with hypoxia [facility]) ICD-10-CM: J96.01  ICD-9-CM: 518.81  8/3/2021 - Present        Essential hypertension ICD-10-CM: I10  ICD-9-CM: 401.9  8/3/2021 - Present        Polycystic kidney disease ICD-10-CM: Q61.3  ICD-9-CM: 753.12  8/3/2021 - Present              Medications reviewed  Current Facility-Administered Medications   Medication Dose Route Frequency    guaiFENesin ER (MUCINEX) tablet 1,200 mg  1,200 mg Oral BID    enoxaparin (LOVENOX) injection 30 mg  30 mg SubCUTAneous Q12H    acetaminophen (TYLENOL) tablet 650 mg  650 mg Oral Q6H PRN    Or    acetaminophen (TYLENOL) suppository 650 mg  650 mg Rectal Q6H PRN    dexAMETHasone (DECADRON) tablet 6 mg  6 mg Oral Q8H    albuterol (PROVENTIL HFA, VENTOLIN HFA, PROAIR HFA) inhaler 2 Puff  2 Puff Inhalation Q4H PRN    azithromycin (ZITHROMAX) 500 mg in 0.9% sodium chloride 250 mL (VIAL-MATE)  500 mg IntraVENous Q24H    HYDROcodone-chlorpheniramine (TUSSIONEX) oral suspension 5 mL  5 mL Oral Q12H PRN    famotidine (PEPCID) tablet 20 mg  20 mg Oral BID    sodium chloride (NS) flush 5-40 mL  5-40 mL IntraVENous Q8H    sodium chloride (NS) flush 5-40 mL  5-40 mL IntraVENous PRN    polyethylene glycol (MIRALAX) packet 17 g  17 g Oral DAILY PRN    ondansetron (ZOFRAN ODT) tablet 4 mg  4 mg Oral Q8H PRN    Or    ondansetron (ZOFRAN) injection 4 mg  4 mg IntraVENous Q6H PRN    amLODIPine (NORVASC) tablet 10 mg  10 mg Oral DAILY    hydrALAZINE (APRESOLINE) 20 mg/mL injection 10 mg  10 mg IntraVENous Q6H PRN    ascorbic acid (vitamin C) (VITAMIN C) tablet 1,000 mg  1,000 mg Oral BID    zinc sulfate (ZINCATE) 50 mg zinc (220 mg) capsule 1 Capsule  1 Capsule Oral DAILY       Review of Systems:   Review of Systems   Constitutional: Positive for malaise/fatigue. Negative for chills and fever. HENT: Negative. Eyes: Negative. Respiratory: Positive for cough and shortness of breath. Negative for hemoptysis, sputum production and wheezing. Cardiovascular: Negative for chest pain and leg swelling. Gastrointestinal: Negative. Genitourinary: Negative. Musculoskeletal: Positive for myalgias. Skin: Negative. Neurological: Negative. Endo/Heme/Allergies: Negative. Psychiatric/Behavioral: Negative. Objective:   Physical Exam:     Visit Vitals  BP (!) 142/90 (BP 1 Location: Left upper arm, BP Patient Position: Supine)   Pulse 74   Temp 97.9 °F (36.6 °C)   Resp 18   Ht 6' 2\" (1.88 m)   Wt 125.6 kg (277 lb)   SpO2 96%   BMI 35.56 kg/m²    O2 Flow Rate (L/min): 3 l/min O2 Device: Nasal cannula    Temp (24hrs), Av.9 °F (36.6 °C), Min:97.5 °F (36.4 °C), Max:98.5 °F (36.9 °C)    No intake/output data recorded.  1901 -  0700  In: -   Out: 0647 [Urine:1175]    General:  Alert, cooperative, no distress, appears stated age. Lungs:   Clear to auscultation bilaterally. Chest wall:  No tenderness or deformity. Heart:  Regular rate and rhythm, S1, S2 normal, no murmur, click, rub or gallop. Abdomen:   Soft, non-tender.  Bowel sounds normal. No masses,  No organomegaly. Extremities: Extremities normal, atraumatic, no cyanosis or edema. Pulses: 2+ and symmetric all extremities. Skin: Skin color, texture, turgor normal. No rashes or lesions   Neurologic: CNII-XII intact. No gross sensory or motor deficits     Data Review:       Recent Days:  Recent Labs     08/05/21 0248 08/04/21  0415 08/03/21  0555   WBC 18.9* 11.6* 13.6*   HGB 16.0 16.0 16.8   HCT 43.6 44.4 45.8    328 309     Recent Labs     08/05/21  0248 08/04/21  0415 08/03/21  0555   * 135* 136   K 4.3 4.3 4.2    104 102   CO2 24 24 26   * 135* 102*   BUN 24* 21* 24*   CREA 1.08 1.14 1.35*   CA 9.1 9.1 9.4   ALB 2.8* 2.8* 3.4*   TBILI 0.8 1.1* 1.6*   ALT 48 48 52     No results for input(s): PH, PCO2, PO2, HCO3, FIO2 in the last 72 hours. 24 Hour Results:  Recent Results (from the past 24 hour(s))   CBC WITH AUTOMATED DIFF    Collection Time: 08/05/21  2:48 AM   Result Value Ref Range    WBC 18.9 (H) 4.1 - 11.1 K/uL    RBC 5.54 4.10 - 5.70 M/uL    HGB 16.0 12.1 - 17.0 g/dL    HCT 43.6 36.6 - 50.3 %    MCV 78.7 (L) 80.0 - 99.0 FL    MCH 28.9 26.0 - 34.0 PG    MCHC 36.7 (H) 30.0 - 36.5 g/dL    RDW 13.2 11.5 - 14.5 %    PLATELET 181 906 - 981 K/uL    MPV 9.7 8.9 - 12.9 FL    NRBC 0.0 0.0  WBC    ABSOLUTE NRBC 0.00 0.00 - 0.01 K/uL    NEUTROPHILS 86 (H) 32 - 75 %    LYMPHOCYTES 6 (L) 12 - 49 %    MONOCYTES 6 5 - 13 %    EOSINOPHILS 0 0 - 7 %    BASOPHILS 0 0 - 1 %    IMMATURE GRANULOCYTES 2 (H) 0 - 0.5 %    ABS. NEUTROPHILS 16.3 (H) 1.8 - 8.0 K/UL    ABS. LYMPHOCYTES 1.1 0.8 - 3.5 K/UL    ABS. MONOCYTES 1.1 (H) 0.0 - 1.0 K/UL    ABS. EOSINOPHILS 0.0 0.0 - 0.4 K/UL    ABS. BASOPHILS 0.0 0.0 - 0.1 K/UL    ABS. IMM.  GRANS. 0.4 (H) 0.00 - 0.04 K/UL    DF AUTOMATED     METABOLIC PANEL, COMPREHENSIVE    Collection Time: 08/05/21  2:48 AM   Result Value Ref Range    Sodium 133 (L) 136 - 145 mmol/L    Potassium 4.3 3.5 - 5.1 mmol/L    Chloride 103 97 - 108 mmol/L    CO2 24 21 - 32 mmol/L    Anion gap 6 5 - 15 mmol/L    Glucose 140 (H) 65 - 100 mg/dL    BUN 24 (H) 6 - 20 mg/dL    Creatinine 1.08 0.70 - 1.30 mg/dL    BUN/Creatinine ratio 22 (H) 12 - 20      GFR est AA >60 >60 ml/min/1.73m2    GFR est non-AA >60 >60 ml/min/1.73m2    Calcium 9.1 8.5 - 10.1 mg/dL    Bilirubin, total 0.8 0.2 - 1.0 mg/dL    AST (SGOT) 21 15 - 37 U/L    ALT (SGPT) 48 12 - 78 U/L    Alk. phosphatase 88 45 - 117 U/L    Protein, total 7.2 6.4 - 8.2 g/dL    Albumin 2.8 (L) 3.5 - 5.0 g/dL    Globulin 4.4 (H) 2.0 - 4.0 g/dL    A-G Ratio 0.6 (L) 1.1 - 2.2     C REACTIVE PROTEIN, QT    Collection Time: 08/05/21  2:48 AM   Result Value Ref Range    C-Reactive protein 7.99 (H) 0.00 - 0.60 mg/dL   D DIMER    Collection Time: 08/05/21  2:48 AM   Result Value Ref Range    D DIMER 0.40 <0.50 ug/ml(FEU)   LD    Collection Time: 08/05/21  2:48 AM   Result Value Ref Range     (H) 85 - 241 U/L   FERRITIN    Collection Time: 08/05/21  2:48 AM   Result Value Ref Range    Ferritin 1,057 (H) 26 - 388 ng/mL   PROCALCITONIN    Collection Time: 08/05/21 11:23 AM   Result Value Ref Range    Procalcitonin <0.05 (H) 0 ng/mL           Assessment/     42-year-old male with a history of hypertension and polycystic kidney disease began with respiratory symptoms 2 weeks ago, confirmed COVID-19 approximately 1 week ago, cough and exertional shortness of breath persist since Medrol Dosepak given in the ED on 7/29/2020. He is hypoxic on presentation with O2 saturation recorded 81%. He looks comfortable at rest.  Chest x-ray consistent with viral pneumonitis. Admitted 2/2 covid pneumonia and acute hypoxic respiratory failure.     -Acute hypoxic respiratory failure with hypoxia, weaning o2 to 3 L   -Covid19 pneumonitis, not meeting sepsis criteria. Not previously vaccinated. Received initial actemra + remdesivir.  Continue dexamethasione.  -Essential hypertension,  Remains above goal  -Polycystic kidney disease     Plan:      -Cont per ID and pulmonology. -remdesivir/dexamethasone/azithromycin and received 1 dose actemra 8/4  -Wean o2 as tolerated to maintain sats>91%  -cont amlodipine + prn hydralazine  -Albuterol in q4 prn  -mucinex        VTEP: Lovenox  Full CODE STATUS  NOK:  Name Relation Home Work Juan He Daughter [phone]      Disposition: Pending course, day 3/5 remdesivir, received actemra, barrier will be o2 supplement. Care Plan discussed with: Patient/Family, Nurse,  and Consultant . Total time spent with patient: 30 minutes. This dictation was done by dragon, computer voice recognition software. Often unanticipated grammatical, syntax, phones and other interpretive errors are inadvertently transcribed. Please excuse errors that have escaped final proofreading.     Mustapha Albarran MD

## 2021-08-05 NOTE — PROGRESS NOTES
Reason for Admission:  COVID                     RUR Score:          12%           Plan for utilizing home health:      Patient politely declines    PCP: First and Last name:  Christopher Correa MD     Name of Practice:    Are you a current patient: Yes/No:    Approximate date of last visit: April 2021   Can you participate in a virtual visit with your PCP:                     Current Advanced Directive/Advance Care Plan: Full Code      Healthcare Decision Maker:   Click here to complete [id number] including selection of the Healthcare Decision Maker Relationship (ie \"Primary\")         DaughterLucas, [phone]                    Transition of Care Plan:                    Patient lives at home alone. He transports himself to follow-up appointments.   Current Dispo: Home/Self Care

## 2021-08-06 ENCOUNTER — APPOINTMENT (OUTPATIENT)
Dept: GENERAL RADIOLOGY | Age: 51
DRG: 137 | End: 2021-08-06
Attending: INTERNAL MEDICINE
Payer: MEDICAID

## 2021-08-06 LAB
ALBUMIN SERPL-MCNC: 2.8 G/DL (ref 3.5–5)
ALBUMIN/GLOB SERPL: 0.7 {RATIO} (ref 1.1–2.2)
ALP SERPL-CCNC: 82 U/L (ref 45–117)
ALT SERPL-CCNC: 48 U/L (ref 12–78)
ANION GAP SERPL CALC-SCNC: 6 MMOL/L (ref 5–15)
AST SERPL W P-5'-P-CCNC: 18 U/L (ref 15–37)
BASOPHILS # BLD: 0 K/UL (ref 0–0.1)
BASOPHILS NFR BLD: 0 % (ref 0–1)
BILIRUB SERPL-MCNC: 0.8 MG/DL (ref 0.2–1)
BUN SERPL-MCNC: 24 MG/DL (ref 6–20)
BUN/CREAT SERPL: 20 (ref 12–20)
CA-I BLD-MCNC: 8.9 MG/DL (ref 8.5–10.1)
CHLORIDE SERPL-SCNC: 104 MMOL/L (ref 97–108)
CO2 SERPL-SCNC: 26 MMOL/L (ref 21–32)
CREAT SERPL-MCNC: 1.18 MG/DL (ref 0.7–1.3)
CRP SERPL-MCNC: 3.28 MG/DL (ref 0–0.6)
DIFFERENTIAL METHOD BLD: ABNORMAL
EOSINOPHIL # BLD: 0 K/UL (ref 0–0.4)
EOSINOPHIL NFR BLD: 0 % (ref 0–7)
ERYTHROCYTE [DISTWIDTH] IN BLOOD BY AUTOMATED COUNT: 13.2 % (ref 11.5–14.5)
FERRITIN SERPL-MCNC: 949 NG/ML (ref 26–388)
GLOBULIN SER CALC-MCNC: 4.1 G/DL (ref 2–4)
GLUCOSE SERPL-MCNC: 126 MG/DL (ref 65–100)
HCT VFR BLD AUTO: 44.2 % (ref 36.6–50.3)
HGB BLD-MCNC: 16.1 G/DL (ref 12.1–17)
IMM GRANULOCYTES # BLD AUTO: 0 K/UL
IMM GRANULOCYTES NFR BLD AUTO: 0 %
LDH SERPL L TO P-CCNC: 297 U/L (ref 85–241)
LYMPHOCYTES # BLD: 0.5 K/UL (ref 0.8–3.5)
LYMPHOCYTES NFR BLD: 3 % (ref 12–49)
MCH RBC QN AUTO: 28.8 PG (ref 26–34)
MCHC RBC AUTO-ENTMCNC: 36.4 G/DL (ref 30–36.5)
MCV RBC AUTO: 79.1 FL (ref 80–99)
MONOCYTES # BLD: 0 K/UL (ref 0–1)
MONOCYTES NFR BLD: 0 % (ref 5–13)
NEUTS BAND NFR BLD MANUAL: 6 % (ref 0–6)
NEUTS SEG # BLD: 16.6 K/UL (ref 1.8–8)
NEUTS SEG NFR BLD: 91 % (ref 32–75)
NRBC # BLD: 0.02 K/UL (ref 0–0.01)
NRBC BLD-RTO: 0.1 PER 100 WBC
PLATELET # BLD AUTO: 459 K/UL (ref 150–400)
PMV BLD AUTO: 9.9 FL (ref 8.9–12.9)
POTASSIUM SERPL-SCNC: 4.3 MMOL/L (ref 3.5–5.1)
PROT SERPL-MCNC: 6.9 G/DL (ref 6.4–8.2)
RBC # BLD AUTO: 5.59 M/UL (ref 4.1–5.7)
RBC MORPH BLD: ABNORMAL
SODIUM SERPL-SCNC: 136 MMOL/L (ref 136–145)
WBC # BLD AUTO: 17.1 K/UL (ref 4.1–11.1)

## 2021-08-06 PROCEDURE — 74011250637 HC RX REV CODE- 250/637: Performed by: INTERNAL MEDICINE

## 2021-08-06 PROCEDURE — 94760 N-INVAS EAR/PLS OXIMETRY 1: CPT

## 2021-08-06 PROCEDURE — 83615 LACTATE (LD) (LDH) ENZYME: CPT

## 2021-08-06 PROCEDURE — 85025 COMPLETE CBC W/AUTO DIFF WBC: CPT

## 2021-08-06 PROCEDURE — 36415 COLL VENOUS BLD VENIPUNCTURE: CPT

## 2021-08-06 PROCEDURE — 80053 COMPREHEN METABOLIC PANEL: CPT

## 2021-08-06 PROCEDURE — 86140 C-REACTIVE PROTEIN: CPT

## 2021-08-06 PROCEDURE — 82728 ASSAY OF FERRITIN: CPT

## 2021-08-06 PROCEDURE — 99232 SBSQ HOSP IP/OBS MODERATE 35: CPT | Performed by: INTERNAL MEDICINE

## 2021-08-06 PROCEDURE — 74011250636 HC RX REV CODE- 250/636: Performed by: INTERNAL MEDICINE

## 2021-08-06 PROCEDURE — 65270000029 HC RM PRIVATE

## 2021-08-06 PROCEDURE — 71045 X-RAY EXAM CHEST 1 VIEW: CPT

## 2021-08-06 RX ADMIN — FAMOTIDINE 20 MG: 20 TABLET ORAL at 09:05

## 2021-08-06 RX ADMIN — GUAIFENESIN 1200 MG: 600 TABLET, EXTENDED RELEASE ORAL at 20:29

## 2021-08-06 RX ADMIN — GUAIFENESIN 1200 MG: 600 TABLET, EXTENDED RELEASE ORAL at 09:05

## 2021-08-06 RX ADMIN — OXYCODONE HYDROCHLORIDE AND ACETAMINOPHEN 1000 MG: 500 TABLET ORAL at 20:30

## 2021-08-06 RX ADMIN — OXYCODONE HYDROCHLORIDE AND ACETAMINOPHEN 1000 MG: 500 TABLET ORAL at 09:05

## 2021-08-06 RX ADMIN — Medication 10 ML: at 05:50

## 2021-08-06 RX ADMIN — DEXAMETHASONE 6 MG: 4 TABLET ORAL at 05:50

## 2021-08-06 RX ADMIN — AZITHROMYCIN DIHYDRATE 500 MG: 500 INJECTION, POWDER, LYOPHILIZED, FOR SOLUTION INTRAVENOUS at 09:05

## 2021-08-06 RX ADMIN — ENOXAPARIN SODIUM 30 MG: 100 INJECTION SUBCUTANEOUS at 09:05

## 2021-08-06 RX ADMIN — DEXAMETHASONE 6 MG: 4 TABLET ORAL at 20:29

## 2021-08-06 RX ADMIN — POLYETHYLENE GLYCOL 3350 17 G: 17 POWDER, FOR SOLUTION ORAL at 20:43

## 2021-08-06 RX ADMIN — DEXAMETHASONE 6 MG: 4 TABLET ORAL at 14:10

## 2021-08-06 RX ADMIN — Medication 10 ML: at 20:30

## 2021-08-06 RX ADMIN — FAMOTIDINE 20 MG: 20 TABLET ORAL at 20:30

## 2021-08-06 RX ADMIN — ENOXAPARIN SODIUM 30 MG: 100 INJECTION SUBCUTANEOUS at 20:29

## 2021-08-06 RX ADMIN — Medication 1 CAPSULE: at 09:05

## 2021-08-06 RX ADMIN — Medication 10 ML: at 14:10

## 2021-08-06 RX ADMIN — AMLODIPINE BESYLATE 10 MG: 5 TABLET ORAL at 09:05

## 2021-08-06 NOTE — PROGRESS NOTES
Pulmonary Progress Note      NAME: Bakari Long   :  1970  MRM:  [id number]    Date/Time: 2021  7:34 PM         Subjective:     Discussed with the RN. Patient has been resting comfortably. He is feeling better. Walking to the bathroom. He has been weaned off oxygen today. Saturation in the mid 90% range on room air. After he used the bathroom it went down to 89% briefly. Small amount of sputum production. Malaise is better. Appetite is better. Past Medical History reviewed and unchanged from Admission History and Physical       Objective:     Physical Exam     Vitals:      Last 24hrs VS reviewed since prior progress note. Most recent are:    Visit Vitals  BP (!) 148/97 (BP 1 Location: Left upper arm, BP Patient Position: At rest;Supine)   Pulse 96   Temp 97.8 °F (36.6 °C)   Resp 18   Ht 6' 2\" (1.88 m)   Wt 125.6 kg (277 lb)   SpO2 95%   BMI 35.56 kg/m²     SpO2 Readings from Last 6 Encounters:   21 95%   21 95%    O2 Flow Rate (L/min): 3 l/min       Intake/Output Summary (Last 24 hours) at 2021 1907  Last data filed at 2021 2139  Gross per 24 hour   Intake    Output 700 ml   Net -700 ml      PHYSICAL EXAMINATION:  GENERAL:  The [facility] who does not appear to be in any distress. HEENT:  Shows pupils are equal and reactive to light. No pallor or icterus. Nasal passages are patent. Oropharynx is without thrush. NECK:  Supple. Trachea is central.  No JVD or lymphadenopathy. He is not using accessory muscles of respiration. CHEST:  Symmetrical with equal and fair air entry bilaterally. He does have some basilar inspiratory crackles. No significant change. No rhonchi or wheezing. No chest wall tenderness. HEART:  Rhythm is regular without any rub, murmur, or gallop. ABDOMEN:  Soft and benign. Bowel sounds are audible. No masses or organomegaly. EXTREMITIES:  Did not show any cyanosis or clubbing. No pitting edema.   Pulses are palpable. NEUROLOGIC:  Grossly intact. SKIN:  Warm and dry. XR CHEST PORT   Final Result      XR CHEST PORT   Final Result   Diffuse airspace opacities in the lungs compatible with Covid-19   pneumonia, not significant changed.              Lab Data Reviewed: (see below)      Medications:  Current Facility-Administered Medications   Medication Dose Route Frequency    guaiFENesin ER (MUCINEX) tablet 1,200 mg  1,200 mg Oral BID    enoxaparin (LOVENOX) injection 30 mg  30 mg SubCUTAneous Q12H    acetaminophen (TYLENOL) tablet 650 mg  650 mg Oral Q6H PRN    Or    acetaminophen (TYLENOL) suppository 650 mg  650 mg Rectal Q6H PRN    dexAMETHasone (DECADRON) tablet 6 mg  6 mg Oral Q8H    albuterol (PROVENTIL HFA, VENTOLIN HFA, PROAIR HFA) inhaler 2 Puff  2 Puff Inhalation Q4H PRN    azithromycin (ZITHROMAX) 500 mg in 0.9% sodium chloride 250 mL (VIAL-MATE)  500 mg IntraVENous Q24H    HYDROcodone-chlorpheniramine (TUSSIONEX) oral suspension 5 mL  5 mL Oral Q12H PRN    famotidine (PEPCID) tablet 20 mg  20 mg Oral BID    sodium chloride (NS) flush 5-40 mL  5-40 mL IntraVENous Q8H    sodium chloride (NS) flush 5-40 mL  5-40 mL IntraVENous PRN    polyethylene glycol (MIRALAX) packet 17 g  17 g Oral DAILY PRN    ondansetron (ZOFRAN ODT) tablet 4 mg  4 mg Oral Q8H PRN    Or    ondansetron (ZOFRAN) injection 4 mg  4 mg IntraVENous Q6H PRN    amLODIPine (NORVASC) tablet 10 mg  10 mg Oral DAILY    hydrALAZINE (APRESOLINE) 20 mg/mL injection 10 mg  10 mg IntraVENous Q6H PRN    ascorbic acid (vitamin C) (VITAMIN C) tablet 1,000 mg  1,000 mg Oral BID    zinc sulfate (ZINCATE) 50 mg zinc (220 mg) capsule 1 Capsule  1 Capsule Oral DAILY       ______________________________________________________________________      Lab Review:     Recent Labs     08/06/21  0608 08/05/21  0248 08/04/21  0415   WBC 17.1* 18.9* 11.6*   HGB 16.1 16.0 16.0   HCT 44.2 43.6 44.4   * 398 328     Recent Labs     08/06/21  0608 08/05/21  0248 08/04/21  0415    133* 135*   K 4.3 4.3 4.3    103 104   CO2 26 24 24   * 140* 135*   BUN 24* 24* 21*   CREA 1.18 1.08 1.14   CA 8.9 9.1 9.1   ALB 2.8* 2.8* 2.8*   ALT 48 48 48     No components found for: GLPOC  No results for input(s): PH, PCO2, PO2, HCO3, FIO2 in the last 72 hours. No results for input(s): INR, INREXT, INREXT, INREXT in the last 72 hours. Other pertinent lab:   Portable chest x-ray done today on 08/03/2021 was compared to a prior study of 07/29/2021. He has somewhat patchy infiltrates involving all lobes of both lungs. CRP is elevated at 10.8. D-dimer is 0.51. Assessment & Plan:      1. The patient has COVID-19 pneumonia. He has extensive bilateral somewhat patchy infiltration involving all lobes of both lungs. He was hypoxic requiring 6 liters of oxygen. His symptoms began on 07/25/2021 approximately. I believe that he is a good candidate for remdesivir. However it appears that it was declined by the hospital pharmacy. Infectious Disease is consulted and the patient has received Actemra. Continue with Decadron 6 mg p.o. q.8 hourly at this time along with azithromycin IV. Continue vitamin C and zinc.  It should be mentioned that the patient did not get COVID-19 vaccine. Overall he has improved. He has now been weaned off oxygen. Chest x-ray was obtained today, personally reviewed. As compared to a prior study of August 3 there is a slight improvement. 2.  Mildly elevated D-dimer. Continue with Lovenox 30 mg subcutaneously q.8 hourly. No need for therapeutic conversion. We will check repeat D-dimer in am.  3.  Hypertension. 4.  Polycystic kidney disease. Continue to monitor kidney function closely. Thank you for allowing me to participate in the care of this patient. I will follow the patient after the weekend. However since he is now off oxygen he can be tentatively discharged home in the next 24 to 48 hours.   I would recommend tapering doses of Decadron over at least 2 weeks.   Discussed with the nursing staff.   Fe Nunn MD

## 2021-08-06 NOTE — PROGRESS NOTES
Hospitalist Progress Note               Daily Progress Note: 66/2021  71-year-old male with a history of hypertension and PKD presents with increased shortness of breath with a known positive Covid19 testing imaging consistent with Covid pneumonia. Symptoms for 2 weeks, tested positive a week ago. ID and pulmonology consulted. Azithromycin, remdesivir, Actemra, dexamethasone initiated. Subjective: The patient is seen for follow  up. Patient is on room air at rest. He states he dropped to 85% when he walked today. Will get walking pulse ox with respiratory therapy  Pt states feels more fatigued an is worried about going home too soon, reassured him. Cough now not productive. Afebrile.     Still exertionally dyspneic though some improvement  ID and pulmonology appreciated    Problem List:  Problem List as of 8/6/2021 Never Reviewed        Codes Class Noted - Resolved    COVID-19 ICD-10-CM: U07.1  ICD-9-CM: 079.89  8/3/2021 - Present        Acute respiratory failure with hypoxia [facility]) ICD-10-CM: J96.01  ICD-9-CM: 518.81  8/3/2021 - Present        Essential hypertension ICD-10-CM: I10  ICD-9-CM: 401.9  8/3/2021 - Present        Polycystic kidney disease ICD-10-CM: Q61.3  ICD-9-CM: 753.12  8/3/2021 - Present              Medications reviewed  Current Facility-Administered Medications   Medication Dose Route Frequency    guaiFENesin ER (MUCINEX) tablet 1,200 mg  1,200 mg Oral BID    enoxaparin (LOVENOX) injection 30 mg  30 mg SubCUTAneous Q12H    acetaminophen (TYLENOL) tablet 650 mg  650 mg Oral Q6H PRN    Or    acetaminophen (TYLENOL) suppository 650 mg  650 mg Rectal Q6H PRN    dexAMETHasone (DECADRON) tablet 6 mg  6 mg Oral Q8H    albuterol (PROVENTIL HFA, VENTOLIN HFA, PROAIR HFA) inhaler 2 Puff  2 Puff Inhalation Q4H PRN    azithromycin (ZITHROMAX) 500 mg in 0.9% sodium chloride 250 mL (VIAL-MATE)  500 mg IntraVENous Q24H    HYDROcodone-chlorpheniramine (TUSSIONEX) oral suspension 5 mL  5 mL Oral Q12H PRN    famotidine (PEPCID) tablet 20 mg  20 mg Oral BID    sodium chloride (NS) flush 5-40 mL  5-40 mL IntraVENous Q8H    sodium chloride (NS) flush 5-40 mL  5-40 mL IntraVENous PRN    polyethylene glycol (MIRALAX) packet 17 g  17 g Oral DAILY PRN    ondansetron (ZOFRAN ODT) tablet 4 mg  4 mg Oral Q8H PRN    Or    ondansetron (ZOFRAN) injection 4 mg  4 mg IntraVENous Q6H PRN    amLODIPine (NORVASC) tablet 10 mg  10 mg Oral DAILY    hydrALAZINE (APRESOLINE) 20 mg/mL injection 10 mg  10 mg IntraVENous Q6H PRN    ascorbic acid (vitamin C) (VITAMIN C) tablet 1,000 mg  1,000 mg Oral BID    zinc sulfate (ZINCATE) 50 mg zinc (220 mg) capsule 1 Capsule  1 Capsule Oral DAILY       Review of Systems:   Review of Systems   Constitutional: Positive for malaise/fatigue. Negative for chills and fever. HENT: Negative. Eyes: Negative. Respiratory: Positive for cough and shortness of breath. Negative for hemoptysis, sputum production and wheezing. Cardiovascular: Negative for chest pain and leg swelling. Gastrointestinal: Negative. Genitourinary: Negative. Musculoskeletal: Positive for myalgias. Skin: Negative. Neurological: Negative. Endo/Heme/Allergies: Negative. Psychiatric/Behavioral: Negative. Objective:   Physical Exam:     Visit Vitals  BP (!) 145/95 (BP 1 Location: Left lower arm, BP Patient Position: At rest;Supine)   Pulse 68   Temp 98.6 °F (37 °C)   Resp 18   Ht 6' 2\" (1.88 m)   Wt 125.6 kg (277 lb)   SpO2 94%   BMI 35.56 kg/m²    O2 Flow Rate (L/min): 3 l/min O2 Device: None (Room air)    Temp (24hrs), Av.1 °F (36.7 °C), Min:97.8 °F (36.6 °C), Max:98.6 °F (37 °C)    No intake/output data recorded.  1901 -  0700  In: -   Out: 1400 [Urine:1400]    General:  Alert, cooperative, no distress, appears stated age. Lungs:   Clear to auscultation bilaterally. Chest wall:  No tenderness or deformity.    Heart:  Regular rate and rhythm, S1, S2 normal, no murmur, click, rub or gallop. Abdomen:   Soft, non-tender. Bowel sounds normal. No masses,  No organomegaly. Extremities: Extremities normal, atraumatic, no cyanosis or edema. Pulses: 2+ and symmetric all extremities. Skin: Skin color, texture, turgor normal. No rashes or lesions   Neurologic: CNII-XII intact. No gross sensory or motor deficits     Data Review:       Recent Days:  Recent Labs     08/06/21  0608 08/05/21  0248 08/04/21  0415   WBC 17.1* 18.9* 11.6*   HGB 16.1 16.0 16.0   HCT 44.2 43.6 44.4   * 398 328     Recent Labs     08/06/21  0608 08/05/21  0248 08/04/21  0415    133* 135*   K 4.3 4.3 4.3    103 104   CO2 26 24 24   * 140* 135*   BUN 24* 24* 21*   CREA 1.18 1.08 1.14   CA 8.9 9.1 9.1   ALB 2.8* 2.8* 2.8*   TBILI 0.8 0.8 1.1*   ALT 48 48 48     No results for input(s): PH, PCO2, PO2, HCO3, FIO2 in the last 72 hours. 24 Hour Results:  Recent Results (from the past 24 hour(s))   PROCALCITONIN    Collection Time: 08/05/21 11:23 AM   Result Value Ref Range    Procalcitonin <0.05 (H) 0 ng/mL   CBC WITH AUTOMATED DIFF    Collection Time: 08/06/21  6:08 AM   Result Value Ref Range    WBC 17.1 (H) 4.1 - 11.1 K/uL    RBC 5.59 4. 10 - 5.70 M/uL    HGB 16.1 12.1 - 17.0 g/dL    HCT 44.2 36.6 - 50.3 %    MCV 79.1 (L) 80.0 - 99.0 FL    MCH 28.8 26.0 - 34.0 PG    MCHC 36.4 30.0 - 36.5 g/dL    RDW 13.2 11.5 - 14.5 %    PLATELET 217 (H) 492 - 400 K/uL    MPV 9.9 8.9 - 12.9 FL    NRBC 0.1 (H) 0.0  WBC    ABSOLUTE NRBC 0.02 (H) 0.00 - 0.01 K/uL    NEUTROPHILS PENDING %    LYMPHOCYTES PENDING %    MONOCYTES PENDING %    EOSINOPHILS PENDING %    BASOPHILS PENDING %    IMMATURE GRANULOCYTES PENDING %    ABS. NEUTROPHILS PENDING K/UL    ABS. LYMPHOCYTES PENDING K/UL    ABS. MONOCYTES PENDING K/UL    ABS. EOSINOPHILS PENDING K/UL    ABS. BASOPHILS PENDING K/UL    ABS. IMM. GRANS.  PENDING K/UL    DF PENDING    METABOLIC PANEL, COMPREHENSIVE    Collection Time: 08/06/21  6:08 AM Result Value Ref Range    Sodium 136 136 - 145 mmol/L    Potassium 4.3 3.5 - 5.1 mmol/L    Chloride 104 97 - 108 mmol/L    CO2 26 21 - 32 mmol/L    Anion gap 6 5 - 15 mmol/L    Glucose 126 (H) 65 - 100 mg/dL    BUN 24 (H) 6 - 20 mg/dL    Creatinine 1.18 0.70 - 1.30 mg/dL    BUN/Creatinine ratio 20 12 - 20      GFR est AA >60 >60 ml/min/1.73m2    GFR est non-AA >60 >60 ml/min/1.73m2    Calcium 8.9 8.5 - 10.1 mg/dL    Bilirubin, total 0.8 0.2 - 1.0 mg/dL    AST (SGOT) 18 15 - 37 U/L    ALT (SGPT) 48 12 - 78 U/L    Alk. phosphatase 82 45 - 117 U/L    Protein, total 6.9 6.4 - 8.2 g/dL    Albumin 2.8 (L) 3.5 - 5.0 g/dL    Globulin 4.1 (H) 2.0 - 4.0 g/dL    A-G Ratio 0.7 (L) 1.1 - 2.2     C REACTIVE PROTEIN, QT    Collection Time: 08/06/21  6:08 AM   Result Value Ref Range    C-Reactive protein 3.28 (H) 0.00 - 0.60 mg/dL   LD    Collection Time: 08/06/21  6:08 AM   Result Value Ref Range     (H) 85 - 241 U/L           Assessment/     41-year-old male with a history of hypertension and polycystic kidney disease began with respiratory symptoms 2 weeks ago, confirmed COVID-19 approximately 1 week ago, cough and exertional shortness of breath persist since Medrol Dosepak given in the ED on 7/29/2020. He is hypoxic on presentation with O2 saturation recorded 81%. He looks comfortable at rest.  Chest x-ray consistent with viral pneumonitis. Admitted 2/2 covid pneumonia and acute hypoxic respiratory failure.     -Acute hypoxic respiratory failure with hypoxia, weaning o2 to 3 L   -Covid19 pneumonitis, not meeting sepsis criteria. Not previously vaccinated. Received initial actemra + remdesivir. Continue dexamethasione.  -Essential hypertension,  Remains above goal  -Polycystic kidney disease     Plan:      -Cont per ID and pulmonology.   -remdesivir/dexamethasone/azithromycin and received 1 dose actemra 8/4  -Wean o2 as tolerated to maintain sats>91%  - Walking pulse ox  -cont amlodipine + prn hydralazine  -Albuterol in q4 prn  -mucinex        VTEP: Lovenox  Full CODE STATUS  NOK:  Name Relation Home Work Juan He Daughter [phone]      Disposition: Pending course, day 3/5 remdesivir, received actemra, barrier will be o2 supplement. Care Plan discussed with: Patient/Family, Nurse,  and Consultant .     Maya Vee MD

## 2021-08-06 NOTE — PROGRESS NOTES
Progress Note    Patient: Ines Tipton MRN: [id number]  SSN: [id number]    YOB: 1970  Age: 48 y.o. Sex: male      Admit Date: 8/3/2021    LOS: 3 days     Subjective:   Patient followed for Covid-19 pneumonia with hypoxia, on Dexamethasone and given Actemra, however, Remdesivir was denied. He remains afebrile with leukocytosis, decreasing LDH, ferritin and CRP. Now on room air. Blood cultures negative so far. CXR today showing slight improvement by my review. Patient resting comfortably with less SEE but indicated that his O2 did drop to 87%. Objective:     Vitals:    08/05/21 2134 08/06/21 0143 08/06/21 0806 08/06/21 0830   BP: 132/85 (!) 135/93 (!) 145/95    Pulse: 72 61 68    Resp: 18 18 18    Temp: 97.8 °F (36.6 °C) 98 °F (36.7 °C) 98.6 °F (37 °C)    SpO2: 98% 93% 95% 94%   Weight:       Height:            Intake and Output:  Current Shift: No intake/output data recorded. Last three shifts: 08/04 1901 - 08/06 0700  In: -   Out: 1400 [Urine:1400]    Physical Exam:   Vitals and nursing note reviewed. Constitutional:       Appearance: He is ill-appearing. HENT: Room air  Cardiovascular:      Rate and Rhythm: Normal rate and regular rhythm. Heart sounds: No murmur heard. Pulmonary: clear bilaterally    Genitourinary:     Comments: No Adamson  Skin:     Findings: No rash. Neurological:      General: No focal deficit present. Mental Status: He is alert and oriented to person, place, and time. Psychiatric:         Mood and Affect: Mood normal.         Behavior: Behavior normal.         Thought Content: Thought content normal.         Judgment: Judgment normal.      Lab/Data Review:     WBC 17,100     <366 <340  Ferritin 1,057 <1,087    Procal <0.05  CRP 3.28 <7.99 <12.10 <10.80    Blood cultures (8/3) No growth 2 days    CXR (8/6) Little change in pronounced patchy peripheral interstitial infiltrates. No  effusion or pneumothorax.  Normal heart and mediastinum  Assessment:     Active Problems:    COVID-19 (8/3/2021)      Acute respiratory failure with hypoxia ([address] (8/3/2021)      Essential hypertension (8/3/2021)      Polycystic kidney disease (8/3/2021)    1. Covid-19 pneumonia, moderately severe, Day #3 Dexamethasone, status post Actemra, Remdesivir denied, clinically improving  2. Acute hypoxic respiratory failure on nasal O2, now on room air  3. Leukocytosis, likely secondary to Dexamethasone  4. Elevated LDH and CRP, secondary to #1, resolving    Comment:  CRP, ferritin and LDH decreasing. Leukocytosis likely steroid related. Patient on room air and CXR improving. Plan:   1. Continue IV Dexamethasone with transition to tapering course of Prednisone  2. Will continue to follow.      Signed By: Fahad Koo MD     August 6, 2021

## 2021-08-07 VITALS
HEART RATE: 73 BPM | HEIGHT: 74 IN | BODY MASS INDEX: 35.55 KG/M2 | TEMPERATURE: 98.4 F | DIASTOLIC BLOOD PRESSURE: 98 MMHG | RESPIRATION RATE: 26 BRPM | OXYGEN SATURATION: 95 % | WEIGHT: 277 LBS | SYSTOLIC BLOOD PRESSURE: 146 MMHG

## 2021-08-07 LAB
CRP SERPL-MCNC: 1.83 MG/DL (ref 0–0.6)
LDH SERPL L TO P-CCNC: 278 U/L (ref 85–241)

## 2021-08-07 PROCEDURE — 74011250636 HC RX REV CODE- 250/636: Performed by: INTERNAL MEDICINE

## 2021-08-07 PROCEDURE — 74011250637 HC RX REV CODE- 250/637: Performed by: INTERNAL MEDICINE

## 2021-08-07 PROCEDURE — 83615 LACTATE (LD) (LDH) ENZYME: CPT

## 2021-08-07 PROCEDURE — 86140 C-REACTIVE PROTEIN: CPT

## 2021-08-07 PROCEDURE — 99232 SBSQ HOSP IP/OBS MODERATE 35: CPT | Performed by: INTERNAL MEDICINE

## 2021-08-07 PROCEDURE — 36415 COLL VENOUS BLD VENIPUNCTURE: CPT

## 2021-08-07 RX ORDER — DEXAMETHASONE 4 MG/1
4 TABLET ORAL 2 TIMES DAILY WITH MEALS
Qty: 8 TABLET | Refills: 0 | Status: SHIPPED | OUTPATIENT
Start: 2021-08-07 | End: 2021-08-11

## 2021-08-07 RX ORDER — AMLODIPINE BESYLATE 10 MG/1
10 TABLET ORAL DAILY
Qty: 30 TABLET | Refills: 1 | Status: SHIPPED | OUTPATIENT
Start: 2021-08-08

## 2021-08-07 RX ADMIN — Medication 10 ML: at 13:03

## 2021-08-07 RX ADMIN — AMLODIPINE BESYLATE 10 MG: 5 TABLET ORAL at 10:18

## 2021-08-07 RX ADMIN — FAMOTIDINE 20 MG: 20 TABLET ORAL at 10:17

## 2021-08-07 RX ADMIN — OXYCODONE HYDROCHLORIDE AND ACETAMINOPHEN 1000 MG: 500 TABLET ORAL at 10:18

## 2021-08-07 RX ADMIN — AZITHROMYCIN DIHYDRATE 500 MG: 500 INJECTION, POWDER, LYOPHILIZED, FOR SOLUTION INTRAVENOUS at 10:19

## 2021-08-07 RX ADMIN — DEXAMETHASONE 6 MG: 4 TABLET ORAL at 06:42

## 2021-08-07 RX ADMIN — Medication 1 CAPSULE: at 10:17

## 2021-08-07 RX ADMIN — GUAIFENESIN 1200 MG: 600 TABLET, EXTENDED RELEASE ORAL at 10:18

## 2021-08-07 RX ADMIN — DEXAMETHASONE 6 MG: 4 TABLET ORAL at 13:03

## 2021-08-07 RX ADMIN — ENOXAPARIN SODIUM 30 MG: 100 INJECTION SUBCUTANEOUS at 10:20

## 2021-08-07 NOTE — DISCHARGE SUMMARY
HOSPITALIST DISCHARGE SUMMARY    NAME: Jina Feliciano   :  1970   MRN:  [id number]     Date/Time:  2021 1:58 PM    DISCHARGE DIAGNOSIS:  Active Problems:    COVID-19 (8/3/2021)      Acute respiratory failure with hypoxia ([address] (8/3/2021)      Essential hypertension (8/3/2021)      Polycystic kidney disease (8/3/2021)        Admission Diagnosis:    COVID-19 positive test (U07.1, COVID-19) with Acute Pneumonia (J12.89, Other viral pneumonia)    CONSULTATIONS: Pulmonary/Intensive care and ID    Procedures: see electronic medical records for all procedures/Xrays and details which were not copied into this note but were reviewed prior to creation of Plan. Please follow-up tests/labs that are still pendin. None     DISCHARGE SUMMARY/HOSPITAL COURSE: for full details see H&P, daily progress notes, labs, consult notes. Briefly As Per HPI:  59-year-old male was admitted to the hospital with a complaint of having shortness of breath and cough. Patient had COVID-19 infection which was positive. Patient was admitted to the hospital and given IV steroids, Zithromax and Actemra. Patient hypoxia improved and he started saturating well on room air. Initially patient was hypoxic on activity. This has also improved now patient is saturating well when walking in the room as well. Patient is stable to be discharged at this time and has been cleared by ID as well.  _______________________________________________________________________   Patient seen and examined by me on day of discharge.   Pertinent findings are:  Vitals:  Visit Vitals  BP (!) 146/98 (BP 1 Location: Left upper arm, BP Patient Position: At rest)   Pulse 73   Temp 98.4 °F (36.9 °C)   Resp 26   Ht 6' 2\" (1.88 m)   Wt 125.6 kg (277 lb)   SpO2 95%   BMI 35.56 kg/m²     Temp (24hrs), Av °F (36.7 °C), Min:97.8 °F (36.6 °C), Max:98.4 °F (36.9 °C)      Last 24hr Input/Output:    Intake/Output Summary (Last 24 hours) at 8/7/2021 1358  Last data filed at 8/7/2021 0309  Gross per 24 hour   Intake    Output 950 ml   Net -950 ml        PHYSICAL EXAM:   General: Alert and awake  Skin: Extremities and face reveal no rashes. No alcaraz erythema. No telangiectasias on the chest wall. HEENT: Sclerae anicteric. Extra-occular muscles are intact. No oral ulcers. No ENT discharge. The neck is supple. Cardiovascular: Regular rate and rhythm. No murmurs, gallops, or rubs. PMI nondisplaced. Carotids without bruits. Respiratory: Comfortable breathing with no accessory muscle use. Clear breath sounds with no wheezes, rales, or rhonchi. GI: Abdomen nondistended, soft, and nontender. Normal active bowel sounds. No enlargement of the liver or spleen. No masses palpable. Rectal: Deferred   Musculoskeletal: No pitting edema of the lower legs. Extremities have good range of motion. No costovertebral tenderness. Neurological: Gross memory appears intact. Patient is alert and oriented. Power 5/5  Psychiatric: Mood appears appropriate with judgement intact. Lymphatic: No cervical or supraclavicular adenopathy. See Discharge Instructions for further details. _______________________________________________________________________  DISPOSITION:    Home with Family: x   Home with HH/PT/OT/RN:    SNF/LTC:    BRANDI:    OTHER:    ________________________________________________________________________  Medications Reviewed:  Current Discharge Medication List      START taking these medications    Details   amLODIPine (NORVASC) 10 mg tablet Take 1 Tablet by mouth daily. Qty: 30 Tablet, Refills: 1  Start date: 8/8/2021      guaiFENesin ER (MUCINEX) 1,200 mg Ta12 ER tablet Take 1 Tablet by mouth two (2) times a day. Qty: 20 Tablet, Refills: 0  Start date: 8/7/2021      dexAMETHasone (DECADRON) 4 mg tablet Take 4 mg by mouth two (2) times daily (with meals) for 4 days.   Qty: 8 Tablet, Refills: 0  Start date: 8/7/2021, End date: 8/11/2021 CONTINUE these medications which have NOT CHANGED    Details   albuterol (ProAir HFA) 90 mcg/actuation inhaler Take 1 Puff by inhalation every four (4) hours as needed for Wheezing. Qty: 1 Inhaler, Refills: 0         STOP taking these medications       methylPREDNISolone (Medrol, Ramses,) 4 mg tablet Comments:   Reason for Stopping:               PMH/SH reviewed - no change compared to H&P  ________________________________________________________________________    Recommended diet:  DIET ADULT    Recommended activity:Activity as tolerated     Follow Up: Follow-up Information     Follow up With Specialties Details Why Contact Hamilton Naidu MD Family Medicine In 1 week  [address] [phone]             ______________________________________________________________________  Total time spent in discharge (min): >35 min   ________________________________________________________________________    Care Plan discussed with:    Comments   Patient x    Family      RN x    Care Manager x                   Consultant:  x ID   ________________________________________________________________________  Attending Physician: Rafaela Brice MD  ________________________________________________________________________  **Lab Data Reviewed:  Recent Results (from the past 24 hour(s))   LD    Collection Time: 08/07/21  7:25 AM   Result Value Ref Range     (H) 85 - 241 U/L   C REACTIVE PROTEIN, QT    Collection Time: 08/07/21  7:25 AM   Result Value Ref Range    C-Reactive protein 1.83 (H) 0.00 - 0.60 mg/dL     XR CHEST PORT   Final Result      XR CHEST PORT   Final Result   Diffuse airspace opacities in the lungs compatible with Covid-19   pneumonia, not significant changed.            Recent Days:  Recent Labs     08/06/21  0608 08/05/21  0248   WBC 17.1* 18.9*   HGB 16.1 16.0   HCT 44.2 43.6   * 398     Recent Labs     08/06/21  0608 08/05/21  0248    133*   K 4.3 4.3    103   CO2 26 24   * 140*   BUN 24* 24*   CREA 1.18 1.08   CA 8.9 9.1   ALB 2.8* 2.8*   TBILI 0.8 0.8   ALT 48 48     No results for input(s): PH, PCO2, PO2, HCO3, FIO2 in the last 72 hours.

## 2021-08-07 NOTE — PROGRESS NOTES
Progress Note    Patient: Romy Washington MRN: [id number]  SSN: [id number]    YOB: 1970  Age: 48 y.o. Sex: male      Admit Date: 8/3/2021    LOS: 4 days     Subjective:   Patient followed for Covid-19 pneumonia with hypoxia, on Dexamethasone and given Actemra, however, Remdesivir was denied. He remains afebrile with leukocytosis, decreasing LDH, ferritin and CRP. Still on room air. Blood cultures negative so far. No CXR today. Patient resting comfortably with mild SEE. No cough. Objective:     Vitals:    08/06/21 1411 08/06/21 2039 08/07/21 0815 08/07/21 0940   BP: (!) 148/97 (!) 142/95  (!) 146/98   Pulse: 96 76  73   Resp: 18 20  26   Temp: 97.8 °F (36.6 °C) 97.9 °F (36.6 °C)  98.4 °F (36.9 °C)   SpO2: 95% 95% 96% 95%   Weight:       Height:            Intake and Output:  Current Shift: No intake/output data recorded. Last three shifts: 08/05 1901 - 08/07 0700  In: -   Out: 1810 [Urine:1650]    Physical Exam:   Vitals and nursing note reviewed. Constitutional:       Appearance: He is ill-appearing. HENT: Room air  Cardiovascular:      Rate and Rhythm: Normal rate and regular rhythm. Heart sounds: No murmur heard. Pulmonary: clear bilaterally    Genitourinary:     Comments: No [place]  Skin:     Findings: No rash. Neurological:      General: No focal deficit present. Mental Status: He is alert and oriented to person, place, and time. Psychiatric:         Mood and Affect: Mood normal.         Behavior: Behavior normal.         Thought Content:  Thought content normal.         Judgment: Judgment normal.      Lab/Data Review:     WBC 17,100     <297 <366 <340  Ferritin 949 <1,057 <1,087    Procal <0.05  CRP 1.83 <3.28 <7.99 <12.10 <10.80    Blood cultures (8/3) No growth 3 days    No CXR today  Assessment:     Active Problems:    COVID-19 (8/3/2021)      Acute respiratory failure with hypoxia ([address] (8/3/2021)      Essential hypertension (8/3/2021)      Polycystic kidney disease (8/3/2021)    1. Covid-19 pneumonia, moderately severe, Day #3 Dexamethasone, status post Actemra, Remdesivir denied, clinically improving  2. Acute hypoxic respiratory failure on nasal O2, now on room air  3. Leukocytosis, likely secondary to Dexamethasone  4. Elevated LDH and CRP, secondary to #1, resolving    Comment:  CRP, ferritin and LDH continue to decrease. Leukocytosis likely steroid related. Patient on room air. Plan:   1. Continue IV Dexamethasone with ?transition to tapering course of Prednisone  2.  Cleared for discharge from ID standpoint    Signed By: Belén Bautista MD     August 7, 2021

## 2021-08-07 NOTE — PROGRESS NOTES
Discharge plan of care/case management plan validated with provider discharge order. Confirmed with MD Brian Dyson at [phone] regarding pt d/c to home today. Pt verbalized understanding of all discharge instructions and need to schedule follow up appointment next week. IV and telemetry monitor discontinued. Pt off floor at [phone].

## 2021-08-09 ENCOUNTER — PATIENT OUTREACH (OUTPATIENT)
Dept: CASE MANAGEMENT | Age: 51
End: 2021-08-09

## 2021-08-09 NOTE — PROGRESS NOTES
Patient contacted regarding COVID-19 diagnosis. Discussed COVID-19 related testing which was not done at this time. Test results were not done. Patient informed of results, if available? Pt reports COVID19 + test on 2021. Care Transition Nurse contacted the patient by telephone to perform post discharge assessment. Call within 2 business days of discharge: Yes Verified name and  with patient as identifiers. Provided introduction to self, and explanation of the CTN/ACM role, and reason for call due to risk factors for infection and/or exposure to COVID-19. Symptoms reviewed with patient who verbalized the following symptoms: no new symptoms and no worsening symptoms      Due to no new or worsening symptoms encounter was not routed to provider for escalation. Discussed follow-up appointments. If no appointment was previously scheduled, appointment scheduling offered:  No.pt reports that he will follow up with PCP and with lung specialist  Loretta Nino Dr follow up appointment(s): No future appointments. Non-[facility] follow up appointment(s): pt states that he will call PCP    Interventions to address risk factors: Obtained and reviewed discharge summary and/or continuity of care documents. Pt reports that he purchased a pulse oximeter and pulse ox is between 94% and 96%. Advance Care Planning:   Does patient have an Advance Directive: not on file. Educated patient about risk for severe COVID-19 due to risk factors according to CDC guidelines. CTN reviewed discharge instructions, medical action plan and red flag symptoms with the patient who verbalized understanding. Discussed COVID vaccination status: no. Education provided on COVID-19 vaccination as appropriate. Discussed exposure protocols and quarantine with CDC Guidelines. Patient was given an opportunity to verbalize any questions and concerns and agrees to contact CTN or health care provider for questions related to their healthcare.     Reviewed and educated patient on any new and changed medications related to discharge diagnosis   Pt reports that he has hospital prescribed medications    Was patient discharged with a pulse oximeter? no Discussed and confirmed pulse oximeter discharge instructions and when to notify provider or seek emergency care. CTN provided contact information. Plan for follow-up call in 3-5 days based on severity of symptoms and risk factors. Pt reports that he is feeling fine except that he becomes SOB with any activity. Pt reminded to seek emergent help if SOB increases or changes.

## 2021-08-10 LAB
BACTERIA SPEC CULT: NORMAL
SPECIAL REQUESTS,SREQ: NORMAL

## 2021-08-13 ENCOUNTER — PATIENT OUTREACH (OUTPATIENT)
Dept: CASE MANAGEMENT | Age: 51
End: 2021-08-13

## 2021-08-18 ENCOUNTER — PATIENT OUTREACH (OUTPATIENT)
Dept: CASE MANAGEMENT | Age: 51
End: 2021-08-18

## 2021-08-18 NOTE — PROGRESS NOTES
Called pt to follow up on hospital discharge. Pt reports that he is currently at  trying to apply for assistance as he has been out of work. Pt verified name and  and states that he did attend PCP appt and no changes were made to medications. Pt confirms that he has a pulse oximeter and oxygen readings are 96%. We reviewed material on when he needs to call for help (pulse ox at 88% when moving and 90% sitting or if SOB gets worse). Pt confirms that he has the telephone number for [facility] and will contact me if he has questions or concerns. Pt continues with cough.

## 2021-08-27 ENCOUNTER — PATIENT OUTREACH (OUTPATIENT)
Dept: CASE MANAGEMENT | Age: 51
End: 2021-08-27

## 2021-09-07 ENCOUNTER — TRANSCRIBE ORDER (OUTPATIENT)
Dept: REGISTRATION | Age: 51
End: 2021-09-07

## 2021-09-07 ENCOUNTER — HOSPITAL ENCOUNTER (OUTPATIENT)
Dept: GENERAL RADIOLOGY | Age: 51
Discharge: HOME | End: 2021-09-07
Payer: MEDICAID

## 2021-09-07 DIAGNOSIS — R06.2 WHEEZING: ICD-10-CM

## 2021-09-07 DIAGNOSIS — R06.2 WHEEZING: Primary | ICD-10-CM

## 2021-09-07 PROCEDURE — 71046 X-RAY EXAM CHEST 2 VIEWS: CPT

## 2021-09-09 ENCOUNTER — PATIENT OUTREACH (OUTPATIENT)
Dept: CASE MANAGEMENT | Age: 51
End: 2021-09-09

## 2021-09-09 NOTE — PROGRESS NOTES
Patient resolved from Transition of Care episode on 9/9/2021. ACM/CTN was unsuccessful at contacting this patient today. Patient/family was provided the following resources and education related to COVID-19 during the initial call:                         Signs, symptoms and red flags related to COVID-19            CDC exposure and quarantine guidelines            [facility] exposure contact - [phone]            Contact for their local Department of Health                 Patient has not had any additional ED or hospital visits. No further outreach scheduled with this CTN/ACM. Episode of Care resolved. Patient has this CTN/ACM contact information if future needs arise.